# Patient Record
Sex: FEMALE | Race: WHITE | Employment: UNEMPLOYED | URBAN - METROPOLITAN AREA
[De-identification: names, ages, dates, MRNs, and addresses within clinical notes are randomized per-mention and may not be internally consistent; named-entity substitution may affect disease eponyms.]

---

## 2018-03-18 ENCOUNTER — HOSPITAL ENCOUNTER (INPATIENT)
Age: 83
LOS: 5 days | Discharge: NURSING, REHAB OR LONG-TERM CARE | DRG: 179 | End: 2018-03-23
Attending: EMERGENCY MEDICINE | Admitting: INTERNAL MEDICINE
Payer: MEDICARE

## 2018-03-18 ENCOUNTER — APPOINTMENT (OUTPATIENT)
Dept: GENERAL RADIOLOGY | Age: 83
DRG: 179 | End: 2018-03-18
Payer: MEDICARE

## 2018-03-18 DIAGNOSIS — J18.9 HCAP (HEALTHCARE-ASSOCIATED PNEUMONIA): Primary | ICD-10-CM

## 2018-03-18 LAB
ALBUMIN SERPL-MCNC: 3.2 G/DL (ref 3.5–5.2)
ALP BLD-CCNC: 136 U/L (ref 35–104)
ALT SERPL-CCNC: 25 U/L (ref 0–32)
ANION GAP SERPL CALCULATED.3IONS-SCNC: 13 MMOL/L (ref 7–16)
AST SERPL-CCNC: 35 U/L (ref 0–31)
BACTERIA: ABNORMAL /HPF
BILIRUB SERPL-MCNC: 0.6 MG/DL (ref 0–1.2)
BILIRUBIN URINE: ABNORMAL
BLOOD, URINE: ABNORMAL
BUN BLDV-MCNC: 18 MG/DL (ref 8–23)
CALCIUM SERPL-MCNC: 9.5 MG/DL (ref 8.6–10.2)
CHLORIDE BLD-SCNC: 102 MMOL/L (ref 98–107)
CLARITY: CLEAR
CO2: 26 MMOL/L (ref 22–29)
COLOR: ABNORMAL
CREAT SERPL-MCNC: 1 MG/DL (ref 0.5–1)
EPITHELIAL CELLS, UA: ABNORMAL /HPF
GFR AFRICAN AMERICAN: >60
GFR NON-AFRICAN AMERICAN: 51 ML/MIN/1.73
GLUCOSE BLD-MCNC: 192 MG/DL (ref 74–109)
GLUCOSE URINE: NEGATIVE MG/DL
HCT VFR BLD CALC: 31.6 % (ref 34–48)
HEMOGLOBIN: 9.3 G/DL (ref 11.5–15.5)
INFLUENZA A BY PCR: NOT DETECTED
INFLUENZA B BY PCR: NOT DETECTED
KETONES, URINE: 40 MG/DL
LACTIC ACID: 1.3 MMOL/L (ref 0.5–2.2)
LEUKOCYTE ESTERASE, URINE: NEGATIVE
LIPASE: 14 U/L (ref 13–60)
MCH RBC QN AUTO: 23.8 PG (ref 26–35)
MCHC RBC AUTO-ENTMCNC: 29.4 % (ref 32–34.5)
MCV RBC AUTO: 81 FL (ref 80–99.9)
NITRITE, URINE: NEGATIVE
PDW BLD-RTO: 16.1 FL (ref 11.5–15)
PH UA: 6 (ref 5–9)
PLATELET # BLD: 344 E9/L (ref 130–450)
PMV BLD AUTO: 10.6 FL (ref 7–12)
POTASSIUM SERPL-SCNC: 3.8 MMOL/L (ref 3.5–5)
PRO-BNP: 1879 PG/ML (ref 0–450)
PROTEIN UA: 100 MG/DL
RBC # BLD: 3.9 E12/L (ref 3.5–5.5)
RBC UA: ABNORMAL /HPF (ref 0–2)
SODIUM BLD-SCNC: 141 MMOL/L (ref 132–146)
SPECIFIC GRAVITY UA: >=1.03 (ref 1–1.03)
TOTAL PROTEIN: 8.5 G/DL (ref 6.4–8.3)
TROPONIN: <0.01 NG/ML (ref 0–0.03)
UROBILINOGEN, URINE: 0.2 E.U./DL
WBC # BLD: 17 E9/L (ref 4.5–11.5)
WBC UA: ABNORMAL /HPF (ref 0–5)

## 2018-03-18 PROCEDURE — 81001 URINALYSIS AUTO W/SCOPE: CPT

## 2018-03-18 PROCEDURE — 36415 COLL VENOUS BLD VENIPUNCTURE: CPT

## 2018-03-18 PROCEDURE — 80053 COMPREHEN METABOLIC PANEL: CPT

## 2018-03-18 PROCEDURE — 6370000000 HC RX 637 (ALT 250 FOR IP): Performed by: INTERNAL MEDICINE

## 2018-03-18 PROCEDURE — 6360000002 HC RX W HCPCS: Performed by: EMERGENCY MEDICINE

## 2018-03-18 PROCEDURE — 87088 URINE BACTERIA CULTURE: CPT

## 2018-03-18 PROCEDURE — 87205 SMEAR GRAM STAIN: CPT

## 2018-03-18 PROCEDURE — 2580000003 HC RX 258: Performed by: EMERGENCY MEDICINE

## 2018-03-18 PROCEDURE — 99222 1ST HOSP IP/OBS MODERATE 55: CPT | Performed by: INTERNAL MEDICINE

## 2018-03-18 PROCEDURE — 87070 CULTURE OTHR SPECIMN AEROBIC: CPT

## 2018-03-18 PROCEDURE — 71045 X-RAY EXAM CHEST 1 VIEW: CPT

## 2018-03-18 PROCEDURE — 83880 ASSAY OF NATRIURETIC PEPTIDE: CPT

## 2018-03-18 PROCEDURE — 84484 ASSAY OF TROPONIN QUANT: CPT

## 2018-03-18 PROCEDURE — 99285 EMERGENCY DEPT VISIT HI MDM: CPT

## 2018-03-18 PROCEDURE — 83690 ASSAY OF LIPASE: CPT

## 2018-03-18 PROCEDURE — 2060000000 HC ICU INTERMEDIATE R&B

## 2018-03-18 PROCEDURE — 83605 ASSAY OF LACTIC ACID: CPT

## 2018-03-18 PROCEDURE — 93005 ELECTROCARDIOGRAM TRACING: CPT | Performed by: EMERGENCY MEDICINE

## 2018-03-18 PROCEDURE — 87040 BLOOD CULTURE FOR BACTERIA: CPT

## 2018-03-18 PROCEDURE — 2580000003 HC RX 258: Performed by: INTERNAL MEDICINE

## 2018-03-18 PROCEDURE — 87502 INFLUENZA DNA AMP PROBE: CPT

## 2018-03-18 PROCEDURE — 85027 COMPLETE CBC AUTOMATED: CPT

## 2018-03-18 PROCEDURE — 51702 INSERT TEMP BLADDER CATH: CPT

## 2018-03-18 PROCEDURE — 6360000002 HC RX W HCPCS: Performed by: INTERNAL MEDICINE

## 2018-03-18 PROCEDURE — 87186 SC STD MICRODIL/AGAR DIL: CPT

## 2018-03-18 PROCEDURE — 6370000000 HC RX 637 (ALT 250 FOR IP): Performed by: EMERGENCY MEDICINE

## 2018-03-18 PROCEDURE — 94640 AIRWAY INHALATION TREATMENT: CPT

## 2018-03-18 PROCEDURE — 96365 THER/PROPH/DIAG IV INF INIT: CPT

## 2018-03-18 PROCEDURE — 2700000000 HC OXYGEN THERAPY PER DAY

## 2018-03-18 RX ORDER — IPRATROPIUM BROMIDE AND ALBUTEROL SULFATE 2.5; .5 MG/3ML; MG/3ML
1 SOLUTION RESPIRATORY (INHALATION)
Status: DISCONTINUED | OUTPATIENT
Start: 2018-03-18 | End: 2018-03-23 | Stop reason: HOSPADM

## 2018-03-18 RX ORDER — SODIUM CHLORIDE 9 MG/ML
INJECTION, SOLUTION INTRAVENOUS CONTINUOUS
Status: DISCONTINUED | OUTPATIENT
Start: 2018-03-18 | End: 2018-03-23 | Stop reason: HOSPADM

## 2018-03-18 RX ORDER — AMITRIPTYLINE HYDROCHLORIDE 10 MG/1
10 TABLET, FILM COATED ORAL NIGHTLY
Status: DISCONTINUED | OUTPATIENT
Start: 2018-03-18 | End: 2018-03-23 | Stop reason: HOSPADM

## 2018-03-18 RX ORDER — LEVOTHYROXINE SODIUM 0.2 MG/1
200 TABLET ORAL DAILY
Status: DISCONTINUED | OUTPATIENT
Start: 2018-03-19 | End: 2018-03-23 | Stop reason: HOSPADM

## 2018-03-18 RX ORDER — SODIUM CHLORIDE 0.9 % (FLUSH) 0.9 %
10 SYRINGE (ML) INJECTION PRN
Status: DISCONTINUED | OUTPATIENT
Start: 2018-03-18 | End: 2018-03-23 | Stop reason: HOSPADM

## 2018-03-18 RX ORDER — GUAIFENESIN/DEXTROMETHORPHAN 100-10MG/5
10 SYRUP ORAL 4 TIMES DAILY
Status: DISCONTINUED | OUTPATIENT
Start: 2018-03-18 | End: 2018-03-23 | Stop reason: HOSPADM

## 2018-03-18 RX ORDER — SUCRALFATE 1 G/1
1 TABLET ORAL 2 TIMES DAILY
Status: DISCONTINUED | OUTPATIENT
Start: 2018-03-18 | End: 2018-03-23 | Stop reason: HOSPADM

## 2018-03-18 RX ORDER — SUCRALFATE 1 G/1
1 TABLET ORAL 2 TIMES DAILY
COMMUNITY

## 2018-03-18 RX ORDER — PANTOPRAZOLE SODIUM 40 MG/1
40 TABLET, DELAYED RELEASE ORAL DAILY
Status: DISCONTINUED | OUTPATIENT
Start: 2018-03-18 | End: 2018-03-23 | Stop reason: HOSPADM

## 2018-03-18 RX ORDER — POTASSIUM CHLORIDE 750 MG/1
10 TABLET, EXTENDED RELEASE ORAL
Status: DISCONTINUED | OUTPATIENT
Start: 2018-03-19 | End: 2018-03-23 | Stop reason: HOSPADM

## 2018-03-18 RX ORDER — GUAIFENESIN 100 MG/5ML
200 SYRUP ORAL EVERY 4 HOURS PRN
Status: ON HOLD | COMMUNITY
End: 2018-03-22 | Stop reason: HOSPADM

## 2018-03-18 RX ORDER — TRAMADOL HYDROCHLORIDE 50 MG/1
50 TABLET ORAL EVERY 6 HOURS PRN
Status: DISCONTINUED | OUTPATIENT
Start: 2018-03-18 | End: 2018-03-23 | Stop reason: HOSPADM

## 2018-03-18 RX ORDER — DOCUSATE SODIUM 100 MG/1
100 CAPSULE, LIQUID FILLED ORAL 2 TIMES DAILY
Status: DISCONTINUED | OUTPATIENT
Start: 2018-03-18 | End: 2018-03-23 | Stop reason: HOSPADM

## 2018-03-18 RX ORDER — CALCIUM CARBONATE 500(1250)
500 TABLET ORAL 2 TIMES DAILY
Status: DISCONTINUED | OUTPATIENT
Start: 2018-03-18 | End: 2018-03-23 | Stop reason: HOSPADM

## 2018-03-18 RX ORDER — ALUMINA, MAGNESIA, AND SIMETHICONE 2400; 2400; 240 MG/30ML; MG/30ML; MG/30ML
30 SUSPENSION ORAL EVERY 4 HOURS PRN
COMMUNITY

## 2018-03-18 RX ORDER — AMITRIPTYLINE HYDROCHLORIDE 10 MG/1
10 TABLET, FILM COATED ORAL NIGHTLY
COMMUNITY

## 2018-03-18 RX ORDER — METOPROLOL SUCCINATE 25 MG/1
25 TABLET, EXTENDED RELEASE ORAL DAILY
Status: DISCONTINUED | OUTPATIENT
Start: 2018-03-18 | End: 2018-03-23 | Stop reason: HOSPADM

## 2018-03-18 RX ORDER — ONDANSETRON 2 MG/ML
4 INJECTION INTRAMUSCULAR; INTRAVENOUS EVERY 6 HOURS PRN
Status: DISCONTINUED | OUTPATIENT
Start: 2018-03-18 | End: 2018-03-23 | Stop reason: HOSPADM

## 2018-03-18 RX ORDER — ACETAMINOPHEN 325 MG/1
650 TABLET ORAL EVERY 4 HOURS PRN
Status: DISCONTINUED | OUTPATIENT
Start: 2018-03-18 | End: 2018-03-23 | Stop reason: HOSPADM

## 2018-03-18 RX ORDER — SODIUM CHLORIDE 0.9 % (FLUSH) 0.9 %
10 SYRINGE (ML) INJECTION EVERY 12 HOURS SCHEDULED
Status: DISCONTINUED | OUTPATIENT
Start: 2018-03-18 | End: 2018-03-23 | Stop reason: HOSPADM

## 2018-03-18 RX ORDER — ACETAMINOPHEN 500 MG
1000 TABLET ORAL ONCE
Status: COMPLETED | OUTPATIENT
Start: 2018-03-18 | End: 2018-03-18

## 2018-03-18 RX ORDER — LANOLIN ALCOHOL/MO/W.PET/CERES
1000 CREAM (GRAM) TOPICAL DAILY
Status: DISCONTINUED | OUTPATIENT
Start: 2018-03-18 | End: 2018-03-23 | Stop reason: HOSPADM

## 2018-03-18 RX ORDER — FUROSEMIDE 20 MG/1
20 TABLET ORAL
Status: DISCONTINUED | OUTPATIENT
Start: 2018-03-19 | End: 2018-03-23 | Stop reason: HOSPADM

## 2018-03-18 RX ADMIN — CEFEPIME HYDROCHLORIDE 2 G: 2 INJECTION, POWDER, FOR SOLUTION INTRAVENOUS at 13:52

## 2018-03-18 RX ADMIN — VANCOMYCIN HYDROCHLORIDE 1250 MG: 10 INJECTION, POWDER, LYOPHILIZED, FOR SOLUTION INTRAVENOUS at 15:42

## 2018-03-18 RX ADMIN — Medication 10 ML: at 20:25

## 2018-03-18 RX ADMIN — ACETAMINOPHEN 1000 MG: 500 TABLET ORAL at 11:33

## 2018-03-18 RX ADMIN — AMITRIPTYLINE HYDROCHLORIDE 10 MG: 10 TABLET, FILM COATED ORAL at 20:26

## 2018-03-18 RX ADMIN — METOPROLOL SUCCINATE 25 MG: 25 TABLET, FILM COATED, EXTENDED RELEASE ORAL at 20:25

## 2018-03-18 RX ADMIN — SUCRALFATE 1 G: 1 TABLET ORAL at 20:25

## 2018-03-18 RX ADMIN — ENOXAPARIN SODIUM 30 MG: 30 INJECTION SUBCUTANEOUS at 20:25

## 2018-03-18 RX ADMIN — Medication 500 MG: at 20:26

## 2018-03-18 RX ADMIN — GUAIFENESIN AND DEXTROMETHORPHAN 10 ML: 100; 10 SYRUP ORAL at 20:25

## 2018-03-18 RX ADMIN — SODIUM CHLORIDE: 9 INJECTION, SOLUTION INTRAVENOUS at 20:24

## 2018-03-18 RX ADMIN — PANTOPRAZOLE SODIUM 40 MG: 40 TABLET, DELAYED RELEASE ORAL at 20:25

## 2018-03-18 RX ADMIN — DOCUSATE SODIUM 100 MG: 100 CAPSULE, LIQUID FILLED ORAL at 20:25

## 2018-03-18 RX ADMIN — IPRATROPIUM BROMIDE AND ALBUTEROL SULFATE 1 AMPULE: 2.5; .5 SOLUTION RESPIRATORY (INHALATION) at 22:26

## 2018-03-18 RX ADMIN — Medication 1000 MCG: at 20:26

## 2018-03-18 NOTE — H&P
History and Physical      CHIEF COMPLAINT:  Cough and fever      HISTORY OF PRESENT ILLNESS:      The patient is a 80 y.o. female patient of Dr. Archana Mathis presents with productive cough and fever with onset 1day earlier. History reviewed with the ER physician and SNF records. The patient mentally impaired d/t dementia and unreliable historian. RADIOLOGY:  Interpreted by Radiologist.  XR CHEST PORTABLE  Final Result   1. Borderline heart size, small pleural effusions and perihilar  vascular congestion. 2. Mild to moderate airspace opacities and consolidation of the lower  lungs more pronounced on the left may represent atelectasis or  infiltrates.   3. Moderate size hiatal hernia       Past Medical History:    Past Medical History:   Diagnosis Date    Acid reflux     Acute blood loss anemia 9/14/2016    Acute Omi ulcer 9/15/2016    Alzheimer's dementia 3/19/2018    B12 deficiency     Gastrointestinal hemorrhage associated with gastritis 9/14/2016    Hypothyroid 9/14/2016    Intertrochanteric fracture of right hip (HCC) 2/3/2015    MCI (mild cognitive impairment)     MCI (mild cognitive impairment) with memory loss 9/14/2016    STARTED 2015; ACUTE CONFUSION 9/16    Non-rheumatic aortic sclerosis ([address] 9/14/2016    OA (osteoarthritis) of ankle     Oropharyngeal dysphagia 3/19/2018    Thyroid disease        Past Surgical History:    Past Surgical History:   Procedure Laterality Date    BACK SURGERY      CARDIAC SURGERY      HIP SURGERY      HIP SURGERY Right [id number]    HYSTERECTOMY      UPPER GASTROINTESTINAL ENDOSCOPY  09/15/2016       Medications Prior to Admission:    Prescriptions Prior to Admission: sucralfate (CARAFATE) 1 GM tablet, Take 1 g by mouth 2 times daily  amitriptyline (ELAVIL) 10 MG tablet, Take 10 mg by mouth nightly  aluminum & magnesium hydroxide-simethicone (MYLANTA) 400-400-40 MG/5ML SUSP, Take 30 mLs by mouth every 4 hours as needed  guaiFENesin (ROBITUSSIN) 100 MG/5ML syrup, Take 200 mg by mouth every 4 hours as needed for Cough  pantoprazole (PROTONIX) 40 MG tablet, Take 1 tablet by mouth daily  cephALEXin (KEFLEX) 500 MG capsule, Take 1 capsule by mouth 4 times daily  levothyroxine (SYNTHROID) 200 MCG tablet, Take 200 mcg by mouth Daily  furosemide (LASIX) 20 MG tablet, Take 20 mg by mouth See Admin Instructions Mon, Wed, Fri ONLY  potassium chloride (MICRO-K) 10 MEQ extended release capsule, Take 10 mEq by mouth See Admin Instructions Mon, Wed, Fri ONLY  docusate sodium (COLACE) 100 MG capsule, Take 100 mg by mouth 2 times daily Indications: Constipation. metoprolol (TOPROL-XL) 25 MG XL tablet, Take 1 tablet by mouth daily. calcium carbonate (OSCAL) 500 MG TABS tablet, Take 500 mg by mouth 2 times daily. acetaminophen (TYLENOL) 325 MG tablet, Take 650 mg by mouth every 4 hours as needed for Pain or Fever   vitamin B-12 (CYANOCOBALAMIN) 1000 MCG tablet, Take 1,000 mcg by mouth daily. traMADol (ULTRAM) 50 MG tablet, Take 50 mg by mouth every 6 hours as needed for Pain . Allergies:    Patient has no known allergies. Social History:    reports that she has never smoked. She does not have any smokeless tobacco history on file. She reports that she does not drink alcohol or use drugs. Family History:   family history is not on file. REVIEW OF SYSTEMS:  As above in the HPI, otherwise negative    PHYSICAL EXAM:    Vitals:  BP (!) 170/76   Pulse 92   Temp 99 °F (37.2 °C) (Oral)   Resp 24   Ht 5' 5\" (1.651 m)   Wt 175 lb (79.4 kg)   SpO2 97%   BMI 29.12 kg/m²     General:   Awake, alert, moist cough. No acute distress. HEENT:    Normocephalic, atraumatic. Pupils equal, round, reactive to light. No scleral icterus. No conjunctival injection. Oropharynx clear. No nasal discharge. Neck:       Supple. No bruits, adenopathy, masses, neck vein distention. Trachea in the midline.   Heart:      RRR, systolic murmurs, no gallops, no rubs  Lungs:     Basilar

## 2018-03-18 NOTE — ED PROVIDER NOTES
endoscopy (09/15/2016). Social History:  reports that she has never smoked. She does not have any smokeless tobacco history on file. She reports that she does not drink alcohol or use drugs. Family History: family history is not on file. The patients home medications have been reviewed. Allergies: Patient has no known allergies. ---------------------------------------------------PHYSICAL EXAM--------------------------------------    Constitutional: She is elderly and alert. No distress. Head: Normocephalic and atraumatic. Eyes: Conjunctivae are normal.   ENT: Airway intact. Mouth: Oropharynx clear, handling secretions  Neck: Neck supple. Cardiovascular: Regular rate. Regular rhythm. Murmur appreciable. Distal pulses are intact. Pulmonary/Chest: No respiratory distress. Breath sounds normal.    Abdominal: Soft. She exhibits mild distension. There is diffuse tenderness with palpation. Musculoskeletal: She exhibits no edema. Moves all extremities x4. Skin: Skin is warm and dry. No rashes. Neurological: She is alert and oriented to person, place, and names of family members, but not the year. Full neurological exam limited by the patient's mental status.    -------------------------------------------------- RESULTS -------------------------------------------------  I have personally reviewed all laboratory and imaging results for this patient. Results are listed below.      LABS:  Results for orders placed or performed during the hospital encounter of 03/18/18   Rapid influenza A/B antigens   Result Value Ref Range    Influenza A by PCR Not Detected Not Detected    Influenza B by PCR Not Detected Not Detected   CBC   Result Value Ref Range    WBC 17.0 (H) 4.5 - 11.5 E9/L    RBC 3.90 3.50 - 5.50 E12/L    Hemoglobin 9.3 (L) 11.5 - 15.5 g/dL    Hematocrit 31.6 (L) 34.0 - 48.0 %    MCV 81.0 80.0 - 99.9 fL    MCH 23.8 (L) 26.0 - 35.0 pg    MCHC 29.4 (L) 32.0 - 34.5 %    RDW 16.1 (H) 11.5 - 15.0 fL    Platelets 505 884 - 983 E9/L    MPV 10.6 7.0 - 12.0 fL   Comprehensive Metabolic Panel   Result Value Ref Range    Sodium 141 132 - 146 mmol/L    Potassium 3.8 3.5 - 5.0 mmol/L    Chloride 102 98 - 107 mmol/L    CO2 26 22 - 29 mmol/L    Anion Gap 13 7 - 16 mmol/L    Glucose 192 (H) 74 - 109 mg/dL    BUN 18 8 - 23 mg/dL    CREATININE 1.0 0.5 - 1.0 mg/dL    GFR Non-African American 51 >=60 mL/min/1.73    GFR African American >60     Calcium 9.5 8.6 - 10.2 mg/dL    Total Protein 8.5 (H) 6.4 - 8.3 g/dL    Alb 3.2 (L) 3.5 - 5.2 g/dL    Total Bilirubin 0.6 0.0 - 1.2 mg/dL    Alkaline Phosphatase 136 (H) 35 - 104 U/L    ALT 25 0 - 32 U/L    AST 35 (H) 0 - 31 U/L   Lactic Acid, Plasma   Result Value Ref Range    Lactic Acid 1.3 0.5 - 2.2 mmol/L   Lipase   Result Value Ref Range    Lipase 14 13 - 60 U/L   Troponin   Result Value Ref Range    Troponin <0.01 0.00 - 0.03 ng/mL   Brain Natriuretic Peptide   Result Value Ref Range    Pro-BNP 1,879 (H) 0 - 450 pg/mL   Urinalysis   Result Value Ref Range    Color, UA DKYELLOW Straw/Yellow    Clarity, UA Clear Clear    Glucose, Ur Negative Negative mg/dL    Bilirubin Urine SMALL (A) Negative    Ketones, Urine 40 (A) Negative mg/dL    Specific Gravity, UA >=1.030 1.005 - 1.030    Blood, Urine MODERATE (A) Negative    pH, UA 6.0 5.0 - 9.0    Protein,  (A) Negative mg/dL    Urobilinogen, Urine 0.2 <2.0 E.U./dL    Nitrite, Urine Negative Negative    Leukocyte Esterase, Urine Negative Negative   Microscopic Urinalysis   Result Value Ref Range    WBC, UA NONE 0 - 5 /HPF    RBC, UA 2-5 0 - 2 /HPF    Epi Cells FEW /HPF    Bacteria, UA FEW (A) /HPF   EKG 12 Lead   Result Value Ref Range    Ventricular Rate 94 BPM    Atrial Rate 94 BPM    P-R Interval 146 ms    QRS Duration 82 ms    Q-T Interval 356 ms    QTc Calculation (Bazett) 445 ms    P Axis 84 degrees    R Axis 13 degrees    T Axis 134 degrees       RADIOLOGY:  Interpreted by Radiologist.  XR CHEST PORTABLE   Final course. Re-Evaluations:            Time: 12:41  Re-evaluation. Patients symptoms show no change. She is resting comfortably, in no distress. Time: 1:43  Re-evaluation. Patients symptoms show no change. She remains in no distress. She is informed of findings and admission. Consultations:   Time: 14:50. Spoke with Dr. Carlos Krishnan (Medicine). Discussed case. They will admit this patient. Counseling: The emergency provider has spoken with the patient and discussed todays results, in addition to providing specific details for the plan of care and counseling regarding the diagnosis and prognosis. Questions are answered at this time and they are agreeable with the plan.      --------------------------------- IMPRESSION AND DISPOSITION ---------------------------------    IMPRESSION  1. HCAP (healthcare-associated pneumonia)        DISPOSITION  Disposition: Admit to telemetry  Patient condition is stable    3/18/18, 10:59 AM.    This note is prepared by Sonu Guillermo, acting as Scribe for Ronan Sesay DO. Ronan Sesay DO:  The scribe's documentation has been prepared under my direction and personally reviewed by me in its entirety. I confirm that the note above accurately reflects all work, treatment, procedures, and medical decision making performed by me. NOTE: This report was transcribed using voice recognition software. Every effort was made to ensure accuracy; however, inadvertent computerized transcription errors may be present.   END OF PROVIDER NOTE        Ronan Sesay DO  04/03/18 0066

## 2018-03-18 NOTE — ED NOTES
Nurse to nurse report called to the floor, spoke to Rohit. Patient's test results reviewed, and vitals signs reported to the receiving nurse. And any and all other important information regarding the patient disclosed.         Carlos Hawk RN  03/18/18 [id number]

## 2018-03-18 NOTE — ED NOTES
Bed: 22  Expected date:   Expected time:   Means of arrival:   Comments:  ems     Layne Joshi RN  03/18/18 [id number]

## 2018-03-18 NOTE — PROGRESS NOTES
Pharmacy Consultation Note  (Antibiotic Dosing and Monitoring)    Initial consult date: 3/18/18  Consulting physician: Dr. Prasad Agustin  Drug(s): Vancomycin   Indication: Pneumonia    BR is a 80year old female starting on empiric antibiotics due to concern for pneumonia. Pharmacy was consulted to assist with dosing of vancomycin. Ht Readings from Last 1 Encounters:   18 5' 5\" (1.651 m)       Age/Gender IBW DW  Allergy Information   80 y.o.  female 57 kg 65.2 kg  Patient has no known allergies. Date  WBC BUN/sCr UOP Drug/Dose Time   Given Level(s)   (Time) Comments   3/18  (#1) 17   Vancomycin 1.25g IV x1 1542     3/19  (#2)    Vancomycin 1g IV q24h <1600>       (#3)            (#4)            (#5)            (#6)            Other Antibiotics/Antifungals/Antivirals Start Date Stop Date Comments   Cefepime 2 mg x1, then cefepime 1g IV Q12h                            Estimated CrCL:  30-35 mL/min    No intake or output data in the 24 hours ending 18 1750    Temp max: Temp (24hrs), Av.9 °F (37.2 °C), Min:97.8 °F (36.6 °C), Max:101 °F (38.3 °C)      Cultures:    Culture Date Result    Rapid Influenza 3/18 Negative   Blood Cxs 3/18 Pending   Resp Cx 3/18 Pending   Urine Cx 3/18 Pending       Assessment:  · Consulted by Dr. Prasad Agustin to dose/monitor vancomycin for possible HCAP  · Patient presented with cough and fever. Chest Xray revealed and moderate air opacities. · Patient was given Vanco 1.25g IV x 1 today in ED  · Goal trough level:  15-20 mcg/mL    Plan:  · Start Vancomycin 1g IV Q24h tomorrow  · Continue to monitor renal renal function and plan to draw trough prior to 4th dose pending clinical course/culture data  · Pharmacist will follow and monitor/adjust dosing as necessary    Thank you for this consult, please page with questions.     Yaakov Guzmán, PharmD, BCPS, BCCCP 3/18/2018 5:50 PM  Pager: [phone]

## 2018-03-19 ENCOUNTER — APPOINTMENT (OUTPATIENT)
Dept: GENERAL RADIOLOGY | Age: 83
DRG: 179 | End: 2018-03-19
Payer: MEDICARE

## 2018-03-19 PROBLEM — F02.80 ALZHEIMER'S DEMENTIA (HCC): Status: ACTIVE | Noted: 2018-03-19

## 2018-03-19 PROBLEM — R13.12 OROPHARYNGEAL DYSPHAGIA: Status: ACTIVE | Noted: 2018-03-19

## 2018-03-19 PROBLEM — G30.9 ALZHEIMER'S DEMENTIA (HCC): Status: ACTIVE | Noted: 2018-03-19

## 2018-03-19 LAB
ANION GAP SERPL CALCULATED.3IONS-SCNC: 17 MMOL/L (ref 7–16)
BASOPHILS ABSOLUTE: 0.05 E9/L (ref 0–0.2)
BASOPHILS RELATIVE PERCENT: 0.4 % (ref 0–2)
BUN BLDV-MCNC: 13 MG/DL (ref 8–23)
CALCIUM SERPL-MCNC: 9.1 MG/DL (ref 8.6–10.2)
CHLORIDE BLD-SCNC: 101 MMOL/L (ref 98–107)
CO2: 23 MMOL/L (ref 22–29)
CREAT SERPL-MCNC: 0.7 MG/DL (ref 0.5–1)
EOSINOPHILS ABSOLUTE: 0.24 E9/L (ref 0.05–0.5)
EOSINOPHILS RELATIVE PERCENT: 1.7 % (ref 0–6)
GFR AFRICAN AMERICAN: >60
GFR NON-AFRICAN AMERICAN: >60 ML/MIN/1.73
GLUCOSE BLD-MCNC: 105 MG/DL (ref 74–109)
HCT VFR BLD CALC: 32.1 % (ref 34–48)
HEMOGLOBIN: 9.5 G/DL (ref 11.5–15.5)
IMMATURE GRANULOCYTES #: 0.1 E9/L
IMMATURE GRANULOCYTES %: 0.7 % (ref 0–5)
LYMPHOCYTES ABSOLUTE: 2.37 E9/L (ref 1.5–4)
LYMPHOCYTES RELATIVE PERCENT: 16.8 % (ref 20–42)
MCH RBC QN AUTO: 24.8 PG (ref 26–35)
MCHC RBC AUTO-ENTMCNC: 29.6 % (ref 32–34.5)
MCV RBC AUTO: 83.8 FL (ref 80–99.9)
MONOCYTES ABSOLUTE: 1.14 E9/L (ref 0.1–0.95)
MONOCYTES RELATIVE PERCENT: 8.1 % (ref 2–12)
NEUTROPHILS ABSOLUTE: 10.2 E9/L (ref 1.8–7.3)
NEUTROPHILS RELATIVE PERCENT: 72.3 % (ref 43–80)
PDW BLD-RTO: 16.2 FL (ref 11.5–15)
PLATELET # BLD: 272 E9/L (ref 130–450)
PMV BLD AUTO: 11.8 FL (ref 7–12)
POTASSIUM REFLEX MAGNESIUM: 4 MMOL/L (ref 3.5–5)
RBC # BLD: 3.83 E12/L (ref 3.5–5.5)
SODIUM BLD-SCNC: 141 MMOL/L (ref 132–146)
WBC # BLD: 14.1 E9/L (ref 4.5–11.5)

## 2018-03-19 PROCEDURE — 2700000000 HC OXYGEN THERAPY PER DAY

## 2018-03-19 PROCEDURE — 6360000002 HC RX W HCPCS: Performed by: INTERNAL MEDICINE

## 2018-03-19 PROCEDURE — 87798 DETECT AGENT NOS DNA AMP: CPT

## 2018-03-19 PROCEDURE — 85025 COMPLETE CBC W/AUTO DIFF WBC: CPT

## 2018-03-19 PROCEDURE — 99233 SBSQ HOSP IP/OBS HIGH 50: CPT | Performed by: INTERNAL MEDICINE

## 2018-03-19 PROCEDURE — 74230 X-RAY XM SWLNG FUNCJ C+: CPT

## 2018-03-19 PROCEDURE — 80048 BASIC METABOLIC PNL TOTAL CA: CPT

## 2018-03-19 PROCEDURE — 36415 COLL VENOUS BLD VENIPUNCTURE: CPT

## 2018-03-19 PROCEDURE — 2060000000 HC ICU INTERMEDIATE R&B

## 2018-03-19 PROCEDURE — 6370000000 HC RX 637 (ALT 250 FOR IP): Performed by: INTERNAL MEDICINE

## 2018-03-19 PROCEDURE — 92611 MOTION FLUOROSCOPY/SWALLOW: CPT

## 2018-03-19 PROCEDURE — 87503 INFLUENZA DNA AMP PROB ADDL: CPT

## 2018-03-19 PROCEDURE — 87501 INFLUENZA DNA AMP PROB 1+: CPT

## 2018-03-19 PROCEDURE — 87581 M.PNEUMON DNA AMP PROBE: CPT

## 2018-03-19 PROCEDURE — 87450 HC DIRECT STREP B ANTIGEN: CPT

## 2018-03-19 PROCEDURE — 87486 CHLMYD PNEUM DNA AMP PROBE: CPT

## 2018-03-19 PROCEDURE — 94640 AIRWAY INHALATION TREATMENT: CPT

## 2018-03-19 PROCEDURE — 2580000003 HC RX 258: Performed by: INTERNAL MEDICINE

## 2018-03-19 RX ADMIN — VANCOMYCIN HYDROCHLORIDE 1000 MG: 1 INJECTION, POWDER, LYOPHILIZED, FOR SOLUTION INTRAVENOUS at 16:50

## 2018-03-19 RX ADMIN — METOPROLOL SUCCINATE 25 MG: 25 TABLET, FILM COATED, EXTENDED RELEASE ORAL at 09:10

## 2018-03-19 RX ADMIN — IPRATROPIUM BROMIDE AND ALBUTEROL SULFATE 1 AMPULE: 2.5; .5 SOLUTION RESPIRATORY (INHALATION) at 22:03

## 2018-03-19 RX ADMIN — SUCRALFATE 1 G: 1 TABLET ORAL at 20:55

## 2018-03-19 RX ADMIN — DOCUSATE SODIUM 100 MG: 100 CAPSULE, LIQUID FILLED ORAL at 09:10

## 2018-03-19 RX ADMIN — CEFEPIME HYDROCHLORIDE 1 G: 1 INJECTION, POWDER, FOR SOLUTION INTRAMUSCULAR; INTRAVENOUS at 02:11

## 2018-03-19 RX ADMIN — IPRATROPIUM BROMIDE AND ALBUTEROL SULFATE 1 AMPULE: 2.5; .5 SOLUTION RESPIRATORY (INHALATION) at 12:53

## 2018-03-19 RX ADMIN — PANTOPRAZOLE SODIUM 40 MG: 40 TABLET, DELAYED RELEASE ORAL at 09:10

## 2018-03-19 RX ADMIN — Medication 500 MG: at 09:10

## 2018-03-19 RX ADMIN — GUAIFENESIN AND DEXTROMETHORPHAN 10 ML: 100; 10 SYRUP ORAL at 14:15

## 2018-03-19 RX ADMIN — GUAIFENESIN AND DEXTROMETHORPHAN 10 ML: 100; 10 SYRUP ORAL at 20:55

## 2018-03-19 RX ADMIN — POTASSIUM CHLORIDE 10 MEQ: 10 TABLET, EXTENDED RELEASE ORAL at 09:10

## 2018-03-19 RX ADMIN — FUROSEMIDE 20 MG: 20 TABLET ORAL at 09:10

## 2018-03-19 RX ADMIN — IPRATROPIUM BROMIDE AND ALBUTEROL SULFATE 1 AMPULE: 2.5; .5 SOLUTION RESPIRATORY (INHALATION) at 09:05

## 2018-03-19 RX ADMIN — DOCUSATE SODIUM 100 MG: 100 CAPSULE, LIQUID FILLED ORAL at 20:55

## 2018-03-19 RX ADMIN — IPRATROPIUM BROMIDE AND ALBUTEROL SULFATE 1 AMPULE: 2.5; .5 SOLUTION RESPIRATORY (INHALATION) at 17:32

## 2018-03-19 RX ADMIN — LEVOTHYROXINE SODIUM 200 MCG: 200 TABLET ORAL at 05:17

## 2018-03-19 RX ADMIN — GUAIFENESIN AND DEXTROMETHORPHAN 10 ML: 100; 10 SYRUP ORAL at 09:09

## 2018-03-19 RX ADMIN — CEFEPIME HYDROCHLORIDE 1 G: 1 INJECTION, POWDER, FOR SOLUTION INTRAMUSCULAR; INTRAVENOUS at 14:15

## 2018-03-19 RX ADMIN — SUCRALFATE 1 G: 1 TABLET ORAL at 09:09

## 2018-03-19 RX ADMIN — AMITRIPTYLINE HYDROCHLORIDE 10 MG: 10 TABLET, FILM COATED ORAL at 20:55

## 2018-03-19 RX ADMIN — ENOXAPARIN SODIUM 30 MG: 30 INJECTION SUBCUTANEOUS at 09:10

## 2018-03-19 RX ADMIN — Medication 500 MG: at 20:55

## 2018-03-19 RX ADMIN — SODIUM CHLORIDE: 9 INJECTION, SOLUTION INTRAVENOUS at 11:34

## 2018-03-19 RX ADMIN — Medication 1000 MCG: at 09:10

## 2018-03-19 ASSESSMENT — PAIN SCALES - GENERAL
PAINLEVEL_OUTOF10: 0

## 2018-03-19 NOTE — PROGRESS NOTES
PROCEDURE     Consistencies Administered During the Evaluation   Liquids: [x]Thin    []Nectar   [x]Honey   Solids:  [x]Pureed/Pudding  []Soft Solid   [x]Cookie   Other:       Method of Intake:   [x]Cup   [x]Spoon  [x]Straw  []Self Fed  [x]Fed by Clinician     Position:   [x]Upright seated  []Upright Standing  []Supine, elevated 45°   []Anterior/Posterior  [x]Lateral   []Oblique                   RESULTS     ORAL STAGE []Functional  [x]Abnormal      [] Dentition: ([]natural  []missing teeth []edentulous []partials []other )     [] Inadequate labial seal resulting anterior labial spillage from ([]right[] left []midline )     [x] Decreased mastication due to ([]poor/missing dentition []decreased lingual control [x]cognitive status)      [] Delayed A-P transit due to ([]decreased initiation[] reduced lingual strength[]cognitive function )     [] Decreased bolus formation resulting in premature pharyngeal spillage      [] Oral residuals []anterior sulcus  []sub lingually  []right buccal cavity []left buccal cavity  []on tongue base  []throughout oral cavity []on superior tongue  []on palate  []on velum          []Comments:        PHARYNGEAL STAGE     [x] Functional  []Abnormal       ONSET TIME    [x] Onset time of the pharyngeal swallow was adequate      [] Delayed initiation of the pharyngeal swallow ([]mild []moderate []marked []severe []absent ).        Swallow reflex was triggered at: ([]tongue base []valleculae []pyriform sinus)      PHARYNGEAL RESIDUALS          Vallecula/Pharyngeal Wall    [x]No significant residuals were noted in the vallecula      []Reduced tongue base retraction resulting in: ([]residuals in vallecula []residual on posterior pharyngeal wall)    These residuals were noted for ([]thin []nectar[] honey []pureed []solid)      which( []cleared  []did not clear  []partially cleared []mostly cleared)       with ([]cued []spontaneous []double swallow []multiple swallow (  times) []liquid chaser)      []Residuals in the vallecula were noted due to inadequate epiglottic inversion        Pyriform Sinuses    [x]No significant residuals were noted in the pyriform sinuses      [] Residuals in the pyriform sinuses were noted due to ([]pharyngeal weakness []cricopharyngeal dysfunction.)       These residuals were noted for ([]thin []nectar []honey []pureed []solid)       which ([]cleared []did not clear  []partially cleared  []mostly cleared)        with ([]cued []spontaneous  []double swallow []multiple swallow (  times) []liquid chaser)    LARYNGEAL PENETRATION   [x]Laryngeal penetration was not present during this evaluation      []Laryngeal penetration occurred prior to aspiration. Further details under aspiration section. []Laryngeal penetration occurred in the absence of aspiration:       []BEFORE the swallow for ([]thin []nectar []honey[] pureed []solid)      due to: [] decreased bolus formation      []premature pharyngeal entry       []delayed pharyngeal swallow           which  []cleared from the laryngeal vestibule spontaneously  (transient)     []cleared from the laryngeal vestibule with a cued, re-directive throat clear       []remained in the laryngeal vestibule. []penetrated deep into the laryngeal vestibule (to the level of the true vocal folds)      Laryngeal penetration was  ([]trace []mild []moderate []marked []severe ) and      occurred ([]inconsistently  []consistently []only with use of a straw). []DURING  the swallow for ([]thin []nectar []honey[] pureed []solid)       due to: []delayed laryngeal closure      []inadequate laryngeal closure        which  []cleared from the laryngeal vestibule spontaneously  (transient)     []cleared from the laryngeal vestibule with a cued, re-directive throat clear       []remained in the laryngeal vestibule.       []penetrated deep into the laryngeal vestibule (to the level of the true vocal folds)          Laryngeal

## 2018-03-19 NOTE — PLAN OF CARE
Problem: Airway Clearance - Ineffective:  Goal: Clear lung sounds  Clear lung sounds  Outcome: Ongoing    Goal: Ability to maintain a clear airway will improve  Ability to maintain a clear airway will improve  Outcome: Ongoing      Problem: Hyperthermia:  Goal: Ability to maintain a body temperature in the normal range will improve  Ability to maintain a body temperature in the normal range will improve  Outcome: Ongoing

## 2018-03-19 NOTE — CONSULTS
children: N/A    Years of education: N/A     Occupational History    Not on file. Social History Main Topics    Smoking status: Never Smoker    Smokeless tobacco: Not on file    Alcohol use No    Drug use: No    Sexual activity: Not on file     Other Topics Concern    Not on file     Social History Narrative    No narrative on file     History   Smoking Status    Never Smoker   Smokeless Tobacco    Not on file     History   Alcohol Use No     History   Drug Use No             HOME MEDICATIONS:  Prior to Admission medications    Medication Sig Start Date End Date Taking? Authorizing Provider   sucralfate (CARAFATE) 1 GM tablet Take 1 g by mouth 2 times daily   Yes Historical Provider, MD   amitriptyline (ELAVIL) 10 MG tablet Take 10 mg by mouth nightly   Yes Historical Provider, MD   aluminum & magnesium hydroxide-simethicone (MYLANTA) 400-400-40 MG/5ML SUSP Take 30 mLs by mouth every 4 hours as needed   Yes Historical Provider, MD   guaiFENesin (ROBITUSSIN) 100 MG/5ML syrup Take 200 mg by mouth every 4 hours as needed for Cough   Yes Historical Provider, MD   pantoprazole (PROTONIX) 40 MG tablet Take 1 tablet by mouth daily 9/16/16  Yes Xuan Weiner,    cephALEXin (KEFLEX) 500 MG capsule Take 1 capsule by mouth 4 times daily 9/16/16  Yes Marlon Weiner,    levothyroxine (SYNTHROID) 200 MCG tablet Take 200 mcg by mouth Daily   Yes Historical Provider, MD   furosemide (LASIX) 20 MG tablet Take 20 mg by mouth See Admin Instructions Mon, Wed, Fri ONLY   Yes Historical Provider, MD   potassium chloride (MICRO-K) 10 MEQ extended release capsule Take 10 mEq by mouth See Admin Instructions Mon, Wed, Fri ONLY   Yes Historical Provider, MD   docusate sodium (COLACE) 100 MG capsule Take 100 mg by mouth 2 times daily Indications: Constipation. Yes Historical Provider, MD   metoprolol (TOPROL-XL) 25 MG XL tablet Take 1 tablet by mouth daily.  2/7/15  Yes Ashvin Webber, DO   calcium carbonate (OSCAL) 500 MG TABS tablet Take 500 mg by mouth 2 times daily. Yes Historical Provider, MD   acetaminophen (TYLENOL) 325 MG tablet Take 650 mg by mouth every 4 hours as needed for Pain or Fever    Yes Historical Provider, MD   vitamin B-12 (CYANOCOBALAMIN) 1000 MCG tablet Take 1,000 mcg by mouth daily. Yes Historical Provider, MD   traMADol (ULTRAM) 50 MG tablet Take 50 mg by mouth every 6 hours as needed for Pain .    Yes Historical Provider, MD       CURRENT MEDICATIONS:  Current Facility-Administered Medications: amitriptyline (ELAVIL) tablet 10 mg, 10 mg, Oral, Nightly  calcium elemental (OSCAL) tablet 500 mg, 500 mg, Oral, BID  docusate sodium (COLACE) capsule 100 mg, 100 mg, Oral, BID  furosemide (LASIX) tablet 20 mg, 20 mg, Oral, Q MWF  levothyroxine (SYNTHROID) tablet 200 mcg, 200 mcg, Oral, Daily  metoprolol succinate (TOPROL XL) extended release tablet 25 mg, 25 mg, Oral, Daily  pantoprazole (PROTONIX) tablet 40 mg, 40 mg, Oral, Daily  potassium chloride (KLOR-CON M) extended release tablet 10 mEq, 10 mEq, Oral, Q MWF  sucralfate (CARAFATE) tablet 1 g, 1 g, Oral, BID  traMADol (ULTRAM) tablet 50 mg, 50 mg, Oral, Q6H PRN  vitamin B-12 (CYANOCOBALAMIN) tablet 1,000 mcg, 1,000 mcg, Oral, Daily  0.9 % sodium chloride infusion, , Intravenous, Continuous  sodium chloride flush 0.9 % injection 10 mL, 10 mL, Intravenous, 2 times per day  sodium chloride flush 0.9 % injection 10 mL, 10 mL, Intravenous, PRN  acetaminophen (TYLENOL) tablet 650 mg, 650 mg, Oral, Q4H PRN  ondansetron (ZOFRAN) injection 4 mg, 4 mg, Intravenous, Q6H PRN  enoxaparin (LOVENOX) injection 30 mg, 30 mg, Subcutaneous, Daily  ipratropium-albuterol (DUONEB) nebulizer solution 1 ampule, 1 ampule, Inhalation, Q4H WA  cefepime (MAXIPIME) 1 g in dextrose 5 % 100 mL IVPB, 1 g, Intravenous, Q12H  guaiFENesin-dextromethorphan (ROBITUSSIN DM) 100-10 MG/5ML syrup 10 mL, 10 mL, Oral, 4x Daily  vancomycin 1000 mg IVPB in 250 mL D5W addavial, 1,000 mg, Intravenous, Q24H    IV MEDICATIONS:   sodium chloride 75 mL/hr at 03/18/18 2024       ALLERGIES:  No Known Allergies    REVIEW OF SYSTEMS:  General ROS:  Fever and   fatigue    ENT ROS:   No Sore throat ,no lymphoadenopathy,no nasal stuffiness     Hematological and Lymphatic ROS:   No ecchymosis ,no tendency to bleed  Respiratory ROS:    sob and cough   Cardiovascular ROS:   No CP,No Palpitation   Gastrointestinal ROS:   No Gi bleed,no nausea or vomiting      - Musculoskeletal ROS:      - no joint swelling ,no joint pain   Neurological ROS:     No focal deficit ,not sure about cognitive disorder    Dermatological ROS:   No skin rash ,no urticaria     PHYSICAL EXAMINATION:     VITAL SIGNS:  BP (!) 171/79   Pulse 98   Temp 98.3 °F (36.8 °C) (Oral)   Resp 20   Ht 5' 5\" (1.651 m)   Wt 171 lb (77.6 kg)   SpO2 94%   BMI 28.46 kg/m²   Wt Readings from Last 3 Encounters:   03/18/18 171 lb (77.6 kg)   09/13/16 175 lb (79.4 kg)   02/02/15 175 lb (79.4 kg)     Temp Readings from Last 3 Encounters:   03/18/18 98.3 °F (36.8 °C) (Oral)   09/16/16 97.8 °F (36.6 °C) (Oral)   02/09/15 98 °F (36.7 °C) (Oral)     TMAX:  BP Readings from Last 3 Encounters:   03/18/18 (!) 171/79   09/16/16 136/60   02/09/15 147/70     Pulse Readings from Last 3 Encounters:   03/18/18 98   09/16/16 74   02/09/15 89           INTAKE/OUTPUTS:  I/O last 3 completed shifts:  In: -   Out: 250 [Urine:250]    Intake/Output Summary (Last 24 hours) at 03/19/18 0000  Last data filed at 03/18/18 2031   Gross per 24 hour   Intake                0 ml   Output              250 ml   Net             -250 ml       General Appearance: alert and oriented to person, place and time, well-developed and   well-nourished, in no acute distress   Eyes: pupils equal, round, and reactive to light, extraocular eye movements intact, conjunctivae normal and sclera anicteric   Neck: neck supple and non tender without mass, no thyromegaly, no thyroid nodules and no cervical adenopathy

## 2018-03-20 LAB
BASOPHILS ABSOLUTE: 0.04 E9/L (ref 0–0.2)
BASOPHILS RELATIVE PERCENT: 0.4 % (ref 0–2)
EOSINOPHILS ABSOLUTE: 0.43 E9/L (ref 0.05–0.5)
EOSINOPHILS RELATIVE PERCENT: 3.9 % (ref 0–6)
FILM ARRAY ADENOVIRUS: NORMAL
FILM ARRAY BORDETELLA PERTUSSIS: NORMAL
FILM ARRAY CHLAMYDOPHILIA PNEUMONIAE: NORMAL
FILM ARRAY CORONAVIRUS 229E: NORMAL
FILM ARRAY CORONAVIRUS HKU1: NORMAL
FILM ARRAY CORONAVIRUS NL63: NORMAL
FILM ARRAY CORONAVIRUS OC43: NORMAL
FILM ARRAY INFLUENZA A VIRUS 09H1: NORMAL
FILM ARRAY INFLUENZA A VIRUS H1: NORMAL
FILM ARRAY INFLUENZA A VIRUS H3: NORMAL
FILM ARRAY INFLUENZA A VIRUS: NORMAL
FILM ARRAY INFLUENZA B: NORMAL
FILM ARRAY METAPNEUMOVIRUS: NORMAL
FILM ARRAY MYCOPLASMA PNEUMONIAE: NORMAL
FILM ARRAY PARAINFLUENZA VIRUS 1: NORMAL
FILM ARRAY PARAINFLUENZA VIRUS 2: NORMAL
FILM ARRAY PARAINFLUENZA VIRUS 3: NORMAL
FILM ARRAY PARAINFLUENZA VIRUS 4: NORMAL
FILM ARRAY RESPIRATORY SYNCITIAL VIRUS: NORMAL
FILM ARRAY RHINOVIRUS/ENTEROVIRUS: NORMAL
HCT VFR BLD CALC: 28.3 % (ref 34–48)
HEMOGLOBIN: 8.6 G/DL (ref 11.5–15.5)
IMMATURE GRANULOCYTES #: 0.07 E9/L
IMMATURE GRANULOCYTES %: 0.6 % (ref 0–5)
LYMPHOCYTES ABSOLUTE: 2.62 E9/L (ref 1.5–4)
LYMPHOCYTES RELATIVE PERCENT: 23.9 % (ref 20–42)
MCH RBC QN AUTO: 25.1 PG (ref 26–35)
MCHC RBC AUTO-ENTMCNC: 30.4 % (ref 32–34.5)
MCV RBC AUTO: 82.7 FL (ref 80–99.9)
MONOCYTES ABSOLUTE: 1.07 E9/L (ref 0.1–0.95)
MONOCYTES RELATIVE PERCENT: 9.8 % (ref 2–12)
NEUTROPHILS ABSOLUTE: 6.72 E9/L (ref 1.8–7.3)
NEUTROPHILS RELATIVE PERCENT: 61.4 % (ref 43–80)
PDW BLD-RTO: 16.1 FL (ref 11.5–15)
PLATELET # BLD: 313 E9/L (ref 130–450)
PMV BLD AUTO: 11.1 FL (ref 7–12)
RBC # BLD: 3.42 E12/L (ref 3.5–5.5)
STREP PNEUMONIAE ANTIGEN, URINE: NORMAL
URINE CULTURE, ROUTINE: NORMAL
WBC # BLD: 11 E9/L (ref 4.5–11.5)

## 2018-03-20 PROCEDURE — 85025 COMPLETE CBC W/AUTO DIFF WBC: CPT

## 2018-03-20 PROCEDURE — 2700000000 HC OXYGEN THERAPY PER DAY

## 2018-03-20 PROCEDURE — 94150 VITAL CAPACITY TEST: CPT

## 2018-03-20 PROCEDURE — 99232 SBSQ HOSP IP/OBS MODERATE 35: CPT | Performed by: INTERNAL MEDICINE

## 2018-03-20 PROCEDURE — 94640 AIRWAY INHALATION TREATMENT: CPT

## 2018-03-20 PROCEDURE — 2060000000 HC ICU INTERMEDIATE R&B

## 2018-03-20 PROCEDURE — 2580000003 HC RX 258: Performed by: INTERNAL MEDICINE

## 2018-03-20 PROCEDURE — 94760 N-INVAS EAR/PLS OXIMETRY 1: CPT

## 2018-03-20 PROCEDURE — 6370000000 HC RX 637 (ALT 250 FOR IP): Performed by: INTERNAL MEDICINE

## 2018-03-20 PROCEDURE — 6360000002 HC RX W HCPCS: Performed by: INTERNAL MEDICINE

## 2018-03-20 PROCEDURE — 36415 COLL VENOUS BLD VENIPUNCTURE: CPT

## 2018-03-20 RX ORDER — AMOXICILLIN AND CLAVULANATE POTASSIUM 875; 125 MG/1; MG/1
1 TABLET, FILM COATED ORAL EVERY 12 HOURS SCHEDULED
Status: DISCONTINUED | OUTPATIENT
Start: 2018-03-20 | End: 2018-03-22

## 2018-03-20 RX ADMIN — Medication 1000 MCG: at 09:34

## 2018-03-20 RX ADMIN — CEFEPIME HYDROCHLORIDE 1 G: 1 INJECTION, POWDER, FOR SOLUTION INTRAMUSCULAR; INTRAVENOUS at 13:22

## 2018-03-20 RX ADMIN — GUAIFENESIN AND DEXTROMETHORPHAN 10 ML: 100; 10 SYRUP ORAL at 13:21

## 2018-03-20 RX ADMIN — SODIUM CHLORIDE: 9 INJECTION, SOLUTION INTRAVENOUS at 20:23

## 2018-03-20 RX ADMIN — SUCRALFATE 1 G: 1 TABLET ORAL at 20:23

## 2018-03-20 RX ADMIN — PANTOPRAZOLE SODIUM 40 MG: 40 TABLET, DELAYED RELEASE ORAL at 09:39

## 2018-03-20 RX ADMIN — DOCUSATE SODIUM 100 MG: 100 CAPSULE, LIQUID FILLED ORAL at 20:23

## 2018-03-20 RX ADMIN — GUAIFENESIN AND DEXTROMETHORPHAN 10 ML: 100; 10 SYRUP ORAL at 20:23

## 2018-03-20 RX ADMIN — VANCOMYCIN HYDROCHLORIDE 1000 MG: 1 INJECTION, POWDER, LYOPHILIZED, FOR SOLUTION INTRAVENOUS at 16:23

## 2018-03-20 RX ADMIN — SUCRALFATE 1 G: 1 TABLET ORAL at 09:34

## 2018-03-20 RX ADMIN — SODIUM CHLORIDE 75 ML/HR: 9 INJECTION, SOLUTION INTRAVENOUS at 02:20

## 2018-03-20 RX ADMIN — AMITRIPTYLINE HYDROCHLORIDE 10 MG: 10 TABLET, FILM COATED ORAL at 20:23

## 2018-03-20 RX ADMIN — Medication 500 MG: at 20:23

## 2018-03-20 RX ADMIN — GUAIFENESIN AND DEXTROMETHORPHAN 10 ML: 100; 10 SYRUP ORAL at 16:23

## 2018-03-20 RX ADMIN — GUAIFENESIN AND DEXTROMETHORPHAN 10 ML: 100; 10 SYRUP ORAL at 04:14

## 2018-03-20 RX ADMIN — ENOXAPARIN SODIUM 30 MG: 30 INJECTION SUBCUTANEOUS at 09:34

## 2018-03-20 RX ADMIN — IPRATROPIUM BROMIDE AND ALBUTEROL SULFATE 1 AMPULE: 2.5; .5 SOLUTION RESPIRATORY (INHALATION) at 12:01

## 2018-03-20 RX ADMIN — DOCUSATE SODIUM 100 MG: 100 CAPSULE, LIQUID FILLED ORAL at 09:34

## 2018-03-20 RX ADMIN — Medication 500 MG: at 09:34

## 2018-03-20 RX ADMIN — CEFEPIME HYDROCHLORIDE 1 G: 1 INJECTION, POWDER, FOR SOLUTION INTRAMUSCULAR; INTRAVENOUS at 02:20

## 2018-03-20 RX ADMIN — IPRATROPIUM BROMIDE AND ALBUTEROL SULFATE 1 AMPULE: 2.5; .5 SOLUTION RESPIRATORY (INHALATION) at 19:27

## 2018-03-20 RX ADMIN — GUAIFENESIN AND DEXTROMETHORPHAN 10 ML: 100; 10 SYRUP ORAL at 09:34

## 2018-03-20 RX ADMIN — IPRATROPIUM BROMIDE AND ALBUTEROL SULFATE 1 AMPULE: 2.5; .5 SOLUTION RESPIRATORY (INHALATION) at 04:04

## 2018-03-20 RX ADMIN — METOPROLOL SUCCINATE 25 MG: 25 TABLET, FILM COATED, EXTENDED RELEASE ORAL at 09:34

## 2018-03-20 RX ADMIN — ACETAMINOPHEN 650 MG: 325 TABLET, FILM COATED ORAL at 04:14

## 2018-03-20 RX ADMIN — LEVOTHYROXINE SODIUM 200 MCG: 200 TABLET ORAL at 05:13

## 2018-03-20 RX ADMIN — IPRATROPIUM BROMIDE AND ALBUTEROL SULFATE 1 AMPULE: 2.5; .5 SOLUTION RESPIRATORY (INHALATION) at 08:11

## 2018-03-20 ASSESSMENT — PAIN SCALES - GENERAL
PAINLEVEL_OUTOF10: 3
PAINLEVEL_OUTOF10: 0
PAINLEVEL_OUTOF10: 3
PAINLEVEL_OUTOF10: 0

## 2018-03-20 ASSESSMENT — PAIN DESCRIPTION - LOCATION: LOCATION: HEAD

## 2018-03-20 ASSESSMENT — PAIN DESCRIPTION - PAIN TYPE: TYPE: ACUTE PAIN

## 2018-03-20 ASSESSMENT — PAIN DESCRIPTION - DESCRIPTORS: DESCRIPTORS: HEADACHE

## 2018-03-20 NOTE — PROGRESS NOTES
airspace opacities of the lung bases more pronounced on the left. There is a moderate size hiatal hernia. Upper abdomen is unremarkable. Mild diffuse osteopenia. Several healed rib fracture deformities of the left hemithorax in the axillary region similar to previously studies. Left humeral head replacement. 1. Borderline heart size, small pleural effusions and perihilar vascular congestion. 2. Mild to moderate airspace opacities and consolidation of the lower lungs more pronounced on the left may represent atelectasis or infiltrates. 3. Moderate size hiatal hernia. Fl Modified Barium Swallow W Video    Result Date: 3/19/2018  Patient MRN:  [id number] : 10/13/1920 Age: 80 years Gender: Female Order Date:  3/18/2018 5:15 PM EXAM: FL MODIFIED BARIUM SWALLOW W VIDEO COMPARISON: None INDICATION:  aspiration pneumonia aspiration pneumonia FINDINGS: Fluoroscopy time: 2.5 minutes. Images: 1 No evidence of aspiration or penetration. There is an oral delay. No retention seen in vallecula or piriform sinuses. No pharyngeal delay. Laryngeal elevation is adequate. No evidence of aspiration or penetration.        Scheduled Meds:   amitriptyline  10 mg Oral Nightly    calcium elemental  500 mg Oral BID    docusate sodium  100 mg Oral BID    furosemide  20 mg Oral Q MWF    levothyroxine  200 mcg Oral Daily    metoprolol succinate  25 mg Oral Daily    pantoprazole  40 mg Oral Daily    potassium chloride  10 mEq Oral Q MWF    sucralfate  1 g Oral BID    vitamin B-12  1,000 mcg Oral Daily    sodium chloride flush  10 mL Intravenous 2 times per day    enoxaparin  30 mg Subcutaneous Daily    ipratropium-albuterol  1 ampule Inhalation Q4H WA    cefepime  1 g Intravenous Q12H    guaiFENesin-dextromethorphan  10 mL Oral 4x Daily    vancomycin  1,000 mg Intravenous Q24H     Continuous Infusions:   sodium chloride 75 mL/hr (18 0220)     PRN Meds:.traMADol, sodium chloride flush, acetaminophen,

## 2018-03-20 NOTE — PROGRESS NOTES
course/culture data  · Pharmacist will follow and monitor/adjust dosing as necessary    Edward Frost PharmD, BCPS 3/20/2018 11:27 AM  Pager:  [phone]

## 2018-03-20 NOTE — PROGRESS NOTES
[facility]  Division of Pulmonary, Critical Care Medicine  Pulmonary [address] [facility]       Pulmonary Progress Note         Patient: Jerry Wall  MRN: [id number]  : 10/13/1920      Date of Admission: .3/18/2018 10:53 AM    Consulting Physician:Dr Levin         Reason for Consultation:  CC : cough and fever     HPI:   SOB has improved since admission with no fever or chills ,no chest pain . She is required less O2  No fever or chills ,no chest pain          PHYSICAL EXAMINATION:     VITAL SIGNS:  /70   Pulse 94   Temp 97.9 °F (36.6 °C) (Oral)   Resp 16   Ht 5' 5\" (1.651 m)   Wt 175 lb (79.4 kg)   SpO2 96%   BMI 29.12 kg/m²   Wt Readings from Last 3 Encounters:   18 175 lb (79.4 kg)   16 175 lb (79.4 kg)   02/02/15 175 lb (79.4 kg)     Temp Readings from Last 3 Encounters:   18 97.9 °F (36.6 °C) (Oral)   16 97.8 °F (36.6 °C) (Oral)   02/09/15 98 °F (36.7 °C) (Oral)     TMAX:  BP Readings from Last 3 Encounters:   18 132/70   16 136/60   02/09/15 147/70     Pulse Readings from Last 3 Encounters:   18 94   16 74   02/09/15 89           INTAKE/OUTPUTS:  I/O last 3 completed shifts: In: 5454 [P.O.:600;  I.V.:2337; IV Piggyback:350]  Out: 650 [Urine:650]    Intake/Output Summary (Last 24 hours) at 18 2338  Last data filed at 18 2232   Gross per 24 hour   Intake             3287 ml   Output              650 ml   Net             2637 ml       General Appearance: alert and oriented to person, place and time, well-developed and   well-nourished, in no acute distress   Eyes: pupils equal, round, and reactive to light, extraocular eye movements intact, conjunctivae normal and sclera anicteric   Neck: neck supple and non tender without mass, no thyromegaly, no thyroid nodules and no cervical adenopathy   Pulmonary/Chest:rhonchi bilateral   Cardiovascular: normal rate, regular rhythm,

## 2018-03-21 ENCOUNTER — APPOINTMENT (OUTPATIENT)
Dept: GENERAL RADIOLOGY | Age: 83
DRG: 179 | End: 2018-03-21
Payer: MEDICARE

## 2018-03-21 LAB
ANION GAP SERPL CALCULATED.3IONS-SCNC: 13 MMOL/L (ref 7–16)
BUN BLDV-MCNC: 7 MG/DL (ref 8–23)
CALCIUM SERPL-MCNC: 9.1 MG/DL (ref 8.6–10.2)
CHLORIDE BLD-SCNC: 98 MMOL/L (ref 98–107)
CO2: 24 MMOL/L (ref 22–29)
CREAT SERPL-MCNC: 0.7 MG/DL (ref 0.5–1)
GFR AFRICAN AMERICAN: >60
GFR NON-AFRICAN AMERICAN: >60 ML/MIN/1.73
GLUCOSE BLD-MCNC: 112 MG/DL (ref 74–109)
POTASSIUM SERPL-SCNC: 3.7 MMOL/L (ref 3.5–5)
SODIUM BLD-SCNC: 135 MMOL/L (ref 132–146)
VANCOMYCIN TROUGH: 7.5 MCG/ML (ref 5–16)

## 2018-03-21 PROCEDURE — 6360000002 HC RX W HCPCS: Performed by: INTERNAL MEDICINE

## 2018-03-21 PROCEDURE — 80202 ASSAY OF VANCOMYCIN: CPT

## 2018-03-21 PROCEDURE — 2580000003 HC RX 258: Performed by: INTERNAL MEDICINE

## 2018-03-21 PROCEDURE — 6370000000 HC RX 637 (ALT 250 FOR IP): Performed by: INTERNAL MEDICINE

## 2018-03-21 PROCEDURE — 94640 AIRWAY INHALATION TREATMENT: CPT

## 2018-03-21 PROCEDURE — 94760 N-INVAS EAR/PLS OXIMETRY 1: CPT

## 2018-03-21 PROCEDURE — 80048 BASIC METABOLIC PNL TOTAL CA: CPT

## 2018-03-21 PROCEDURE — 36415 COLL VENOUS BLD VENIPUNCTURE: CPT

## 2018-03-21 PROCEDURE — 71045 X-RAY EXAM CHEST 1 VIEW: CPT

## 2018-03-21 PROCEDURE — 99233 SBSQ HOSP IP/OBS HIGH 50: CPT | Performed by: INTERNAL MEDICINE

## 2018-03-21 PROCEDURE — 94150 VITAL CAPACITY TEST: CPT

## 2018-03-21 PROCEDURE — 2060000000 HC ICU INTERMEDIATE R&B

## 2018-03-21 PROCEDURE — 2580000003 HC RX 258

## 2018-03-21 PROCEDURE — 2700000000 HC OXYGEN THERAPY PER DAY

## 2018-03-21 RX ADMIN — FUROSEMIDE 20 MG: 20 TABLET ORAL at 09:03

## 2018-03-21 RX ADMIN — Medication 500 MG: at 09:03

## 2018-03-21 RX ADMIN — DOCUSATE SODIUM 100 MG: 100 CAPSULE, LIQUID FILLED ORAL at 21:25

## 2018-03-21 RX ADMIN — PANTOPRAZOLE SODIUM 40 MG: 40 TABLET, DELAYED RELEASE ORAL at 09:03

## 2018-03-21 RX ADMIN — SUCRALFATE 1 G: 1 TABLET ORAL at 21:25

## 2018-03-21 RX ADMIN — Medication 1000 MCG: at 09:03

## 2018-03-21 RX ADMIN — IPRATROPIUM BROMIDE AND ALBUTEROL SULFATE 1 AMPULE: 2.5; .5 SOLUTION RESPIRATORY (INHALATION) at 12:55

## 2018-03-21 RX ADMIN — ENOXAPARIN SODIUM 30 MG: 30 INJECTION SUBCUTANEOUS at 09:02

## 2018-03-21 RX ADMIN — POTASSIUM CHLORIDE 10 MEQ: 10 TABLET, EXTENDED RELEASE ORAL at 09:03

## 2018-03-21 RX ADMIN — IPRATROPIUM BROMIDE AND ALBUTEROL SULFATE 1 AMPULE: 2.5; .5 SOLUTION RESPIRATORY (INHALATION) at 09:18

## 2018-03-21 RX ADMIN — DOCUSATE SODIUM 100 MG: 100 CAPSULE, LIQUID FILLED ORAL at 09:03

## 2018-03-21 RX ADMIN — METOPROLOL SUCCINATE 25 MG: 25 TABLET, FILM COATED, EXTENDED RELEASE ORAL at 09:03

## 2018-03-21 RX ADMIN — IPRATROPIUM BROMIDE AND ALBUTEROL SULFATE 1 AMPULE: 2.5; .5 SOLUTION RESPIRATORY (INHALATION) at 17:34

## 2018-03-21 RX ADMIN — LEVOTHYROXINE SODIUM 200 MCG: 200 TABLET ORAL at 05:04

## 2018-03-21 RX ADMIN — GUAIFENESIN AND DEXTROMETHORPHAN 10 ML: 100; 10 SYRUP ORAL at 21:25

## 2018-03-21 RX ADMIN — SUCRALFATE 1 G: 1 TABLET ORAL at 09:03

## 2018-03-21 RX ADMIN — VANCOMYCIN HYDROCHLORIDE 1000 MG: 1 INJECTION, POWDER, LYOPHILIZED, FOR SOLUTION INTRAVENOUS at 21:25

## 2018-03-21 RX ADMIN — AMITRIPTYLINE HYDROCHLORIDE 10 MG: 10 TABLET, FILM COATED ORAL at 21:25

## 2018-03-21 RX ADMIN — GUAIFENESIN AND DEXTROMETHORPHAN 10 ML: 100; 10 SYRUP ORAL at 09:03

## 2018-03-21 RX ADMIN — AMOXICILLIN AND CLAVULANATE POTASSIUM 1 TABLET: 875; 125 TABLET, FILM COATED ORAL at 00:05

## 2018-03-21 RX ADMIN — GUAIFENESIN AND DEXTROMETHORPHAN 10 ML: 100; 10 SYRUP ORAL at 14:20

## 2018-03-21 RX ADMIN — Medication 10 ML: at 21:25

## 2018-03-21 RX ADMIN — IPRATROPIUM BROMIDE AND ALBUTEROL SULFATE 1 AMPULE: 2.5; .5 SOLUTION RESPIRATORY (INHALATION) at 21:21

## 2018-03-21 RX ADMIN — AMOXICILLIN AND CLAVULANATE POTASSIUM 1 TABLET: 875; 125 TABLET, FILM COATED ORAL at 21:25

## 2018-03-21 RX ADMIN — AMOXICILLIN AND CLAVULANATE POTASSIUM 1 TABLET: 875; 125 TABLET, FILM COATED ORAL at 09:03

## 2018-03-21 RX ADMIN — Medication 500 MG: at 21:25

## 2018-03-21 ASSESSMENT — PAIN SCALES - GENERAL
PAINLEVEL_OUTOF10: 0

## 2018-03-21 NOTE — PROGRESS NOTES
Clinical Pharmacy Note:    Vancomycin has been discontinued.   Clinical pharmacy will sign off.     Favian Zhou PharmD, BCPS 3/21/2018 8:32 AM  Pager:  [phone]

## 2018-03-21 NOTE — PROGRESS NOTES
[facility]  Division of Pulmonary, Critical Care Medicine  Pulmonary [address] [facility]       Pulmonary Progress Note         Patient: Ekaterina Martinez  MRN: [id number]  : 10/13/1920      Date of Admission: .3/18/2018 10:53 AM    Consulting Physician:Dr Levin         Reason for Consultation:  CC : cough and fever      HPI:     Continue to feel better  Has some cough   No fever or chills still on 4 L   Has some yellow sputum   She remain confused    PHYSICAL EXAMINATION:     VITAL SIGNS:  BP (!) 144/62   Pulse 98   Temp 98.6 °F (37 °C) (Oral)   Resp 18   Ht 5' 5\" (1.651 m)   Wt 175 lb 1.6 oz (79.4 kg)   SpO2 98%   BMI 29.14 kg/m²   Wt Readings from Last 3 Encounters:   18 175 lb 1.6 oz (79.4 kg)   16 175 lb (79.4 kg)   02/02/15 175 lb (79.4 kg)     Temp Readings from Last 3 Encounters:   18 98.6 °F (37 °C) (Oral)   16 97.8 °F (36.6 °C) (Oral)   02/09/15 98 °F (36.7 °C) (Oral)     TMAX:  BP Readings from Last 3 Encounters:   18 (!) 144/62   16 136/60   02/09/15 147/70     Pulse Readings from Last 3 Encounters:   18 98   16 74   02/09/15 89           INTAKE/OUTPUTS:  I/O last 3 completed shifts: In: 2017.7 [P.O.:1040;  I.V.:977.7]  Out: 1750 [Urine:1750]    Intake/Output Summary (Last 24 hours) at 18 1638  Last data filed at 18 1436   Gross per 24 hour   Intake          1897.67 ml   Output             1450 ml   Net           447.67 ml       General Appearance: alert and oriented to person, place and time, well-developed and   well-nourished, in no acute distress   Eyes: pupils equal, round, and reactive to light, extraocular eye movements intact, conjunctivae normal and sclera anicteric   Neck: neck supple and non tender without mass, no thyromegaly, no thyroid nodules and no cervical adenopathy   Pulmonary/Chest:rhonchi bilateral   Cardiovascular: normal rate, regular rhythm, normal S1 and

## 2018-03-21 NOTE — PROGRESS NOTES
Pharmacy Consultation Note  (Antibiotic Dosing and Monitoring)    Initial consult date: 3/18/18  Consulting physician: Dr. rIais Mckeon  Drug(s): Vancomycin   Indication: Pneumonia    BR is a 80year old female starting on empiric antibiotics due to concern for pneumonia. Pharmacy was consulted to assist with dosing of vancomycin. Ht Readings from Last 1 Encounters:   18 5' 5\" (1.651 m)       Age/Gender IBW DW  Allergy Information   80 y.o.  female 57 kg 79.4 kg   Patient has no known allergies. Date  WBC BUN/SCr Drug/Dose Time   Given Level(s)   (Time) Comments   3/18  (#1) 17.0 18/1.0 Vancomycin 1.25g IV x1 1542     3/19  (#2) 14.1 13/0.7 Vancomycin 1g IV q24h 1650     3/20  (#3) -- -- Vancomycin 1g IV q24h 1623     3/21  (#4) 11.0 7/0.7         (#5)           (#6)           Other Antibiotics/Antifungals/Antivirals Start Date Stop Date Comments   Cefepime 2 mg x1, then cefepime 1g IV Q12h                          Estimated CrCL:  ~30-35 mL/min    Intake/Output Summary (Last 24 hours) at 18 1805  Last data filed at 18 1436   Gross per 24 hour   Intake          1897.67 ml   Output             1450 ml   Net           447.67 ml   Incomplete documentation     Temp max: Temp (24hrs), Av °F (36.7 °C), Min:97.4 °F (36.3 °C), Max:98.6 °F (37 °C)    Cultures:    Culture Date Result    Rapid Influenza 3/18 Negative   Blood Cxs 3/18 No growth to date   Resp Cx 3/18 Moderate polys, rare epis, few GP diplococci;Oral jairo reduced  S. Aureus    Urine Cx 3/18 <10,000 cfu/mL GP organism       Assessment:  · Consulted by Dr. Andrei Dixon to dose/monitor vancomycin for possible HCAP  · Patient presented with cough and fever. Chest Xray revealed and moderate air opacities. · Goal trough level:  15-20 mcg/mL  · SCr 0.7 mg/dL today  · Resp cx demonstrate S. Aureus     Plan:  · Vanco consult re-added for staph growing in resp cx.  Will check trough at this time and then restart appropriate

## 2018-03-21 NOTE — PLAN OF CARE
Problem: Falls - Risk of  Goal: Absence of falls  Outcome: Met This Shift      Problem: Airway Clearance - Ineffective:  Goal: Ability to maintain a clear airway will improve  Ability to maintain a clear airway will improve   Outcome: Met This Shift

## 2018-03-21 NOTE — PROGRESS NOTES
S1 and S2, no murmurs, rubs, clicks or gallops, distal pulses intact, no carotid bruits, no murmurs, no gallops, no carotid bruits and no JVD   Abdomen: obese, soft, non-tender, non-distended, normal bowel sounds, no masses or organomegaly   Extremities:no edema ,no cyanosis   Musculoskeletal: normal range of motion, no joint swelling, deformity or tenderness   Neurologic: reflexes normal and symmetric, no cranial nerve deficit noted    LABS/IMAGING:    CBC:  Lab Results   Component Value Date    WBC 11.0 03/20/2018    HGB 8.6 (L) 03/20/2018    HCT 28.3 (L) 03/20/2018    MCV 82.7 03/20/2018     03/20/2018    LYMPHOPCT 23.9 03/20/2018    RBC 3.42 (L) 03/20/2018    MCH 25.1 (L) 03/20/2018    MCHC 30.4 (L) 03/20/2018    RDW 16.1 (H) 03/20/2018    NEUTOPHILPCT 61.4 03/20/2018    MONOPCT 9.8 03/20/2018    BASOPCT 0.4 03/20/2018    NEUTROABS 6.72 03/20/2018    LYMPHSABS 2.62 03/20/2018    MONOSABS 1.07 (H) 03/20/2018    EOSABS 0.43 03/20/2018    BASOSABS 0.04 03/20/2018       Recent Labs      03/20/18   1814  03/19/18   0717  03/18/18   1126   WBC  11.0  14.1*  17.0*   HGB  8.6*  9.5*  9.3*   HCT  28.3*  32.1*  31.6*   MCV  82.7  83.8  81.0   PLT  313  272  344       BMP:   Recent Labs      03/18/18   1126  03/19/18   0717   NA  141  141   K  3.8  4.0   CL  102  101   CO2  26  23   BUN  18  13   CREATININE  1.0  0.7       MG:   Lab Results   Component Value Date    MG 1.8 09/16/2016     Ca/Phos:   Lab Results   Component Value Date    CALCIUM 9.1 03/19/2018     Amylase: No results found for: AMYLASE  Lipase:   Lab Results   Component Value Date    LIPASE 14 03/18/2018     LIVER PROFILE:   Recent Labs      03/18/18   1126   AST  35*   ALT  25   LIPASE  14   BILITOT  0.6   ALKPHOS  136*       PT/INR: No results for input(s): PROTIME, INR in the last 72 hours. APTT: No results for input(s): APTT in the last 72 hours.     Cardiac Enzymes:  Lab Results   Component Value Date    CKTOTAL 92 03/13/2012    CKMB <0.2

## 2018-03-22 LAB
CULTURE, RESPIRATORY: ABNORMAL
CULTURE, RESPIRATORY: ABNORMAL
ORGANISM: ABNORMAL
SMEAR, RESPIRATORY: ABNORMAL

## 2018-03-22 PROCEDURE — 6360000002 HC RX W HCPCS: Performed by: INTERNAL MEDICINE

## 2018-03-22 PROCEDURE — 2060000000 HC ICU INTERMEDIATE R&B

## 2018-03-22 PROCEDURE — 2580000003 HC RX 258: Performed by: INTERNAL MEDICINE

## 2018-03-22 PROCEDURE — 94150 VITAL CAPACITY TEST: CPT

## 2018-03-22 PROCEDURE — 2700000000 HC OXYGEN THERAPY PER DAY

## 2018-03-22 PROCEDURE — 94640 AIRWAY INHALATION TREATMENT: CPT

## 2018-03-22 PROCEDURE — 6370000000 HC RX 637 (ALT 250 FOR IP): Performed by: INTERNAL MEDICINE

## 2018-03-22 PROCEDURE — 99232 SBSQ HOSP IP/OBS MODERATE 35: CPT | Performed by: INTERNAL MEDICINE

## 2018-03-22 RX ORDER — GUAIFENESIN/DEXTROMETHORPHAN 100-10MG/5
10 SYRUP ORAL 4 TIMES DAILY
Qty: 120 ML | DISCHARGE
Start: 2018-03-22 | End: 2018-04-01

## 2018-03-22 RX ORDER — IPRATROPIUM BROMIDE AND ALBUTEROL SULFATE 2.5; .5 MG/3ML; MG/3ML
3 SOLUTION RESPIRATORY (INHALATION)
Qty: 360 ML | DISCHARGE
Start: 2018-03-22

## 2018-03-22 RX ADMIN — IPRATROPIUM BROMIDE AND ALBUTEROL SULFATE 1 AMPULE: 2.5; .5 SOLUTION RESPIRATORY (INHALATION) at 20:21

## 2018-03-22 RX ADMIN — TRAMADOL HYDROCHLORIDE 50 MG: 50 TABLET, FILM COATED ORAL at 23:14

## 2018-03-22 RX ADMIN — Medication 10 ML: at 09:28

## 2018-03-22 RX ADMIN — VANCOMYCIN HYDROCHLORIDE 1000 MG: 1 INJECTION, POWDER, LYOPHILIZED, FOR SOLUTION INTRAVENOUS at 09:28

## 2018-03-22 RX ADMIN — IPRATROPIUM BROMIDE AND ALBUTEROL SULFATE 1 AMPULE: 2.5; .5 SOLUTION RESPIRATORY (INHALATION) at 16:00

## 2018-03-22 RX ADMIN — IPRATROPIUM BROMIDE AND ALBUTEROL SULFATE 1 AMPULE: 2.5; .5 SOLUTION RESPIRATORY (INHALATION) at 12:53

## 2018-03-22 RX ADMIN — DOCUSATE SODIUM 100 MG: 100 CAPSULE, LIQUID FILLED ORAL at 23:03

## 2018-03-22 RX ADMIN — VANCOMYCIN HYDROCHLORIDE 1000 MG: 1 INJECTION, POWDER, LYOPHILIZED, FOR SOLUTION INTRAVENOUS at 23:01

## 2018-03-22 RX ADMIN — GUAIFENESIN AND DEXTROMETHORPHAN 10 ML: 100; 10 SYRUP ORAL at 23:03

## 2018-03-22 RX ADMIN — ENOXAPARIN SODIUM 30 MG: 30 INJECTION SUBCUTANEOUS at 09:28

## 2018-03-22 RX ADMIN — Medication 500 MG: at 09:28

## 2018-03-22 RX ADMIN — PANTOPRAZOLE SODIUM 40 MG: 40 TABLET, DELAYED RELEASE ORAL at 09:28

## 2018-03-22 RX ADMIN — DOCUSATE SODIUM 100 MG: 100 CAPSULE, LIQUID FILLED ORAL at 09:28

## 2018-03-22 RX ADMIN — SUCRALFATE 1 G: 1 TABLET ORAL at 09:28

## 2018-03-22 RX ADMIN — IPRATROPIUM BROMIDE AND ALBUTEROL SULFATE 1 AMPULE: 2.5; .5 SOLUTION RESPIRATORY (INHALATION) at 09:03

## 2018-03-22 RX ADMIN — METOPROLOL SUCCINATE 25 MG: 25 TABLET, FILM COATED, EXTENDED RELEASE ORAL at 09:28

## 2018-03-22 RX ADMIN — GUAIFENESIN AND DEXTROMETHORPHAN 10 ML: 100; 10 SYRUP ORAL at 16:51

## 2018-03-22 RX ADMIN — GUAIFENESIN AND DEXTROMETHORPHAN 10 ML: 100; 10 SYRUP ORAL at 09:28

## 2018-03-22 RX ADMIN — Medication 500 MG: at 23:04

## 2018-03-22 RX ADMIN — AMOXICILLIN AND CLAVULANATE POTASSIUM 1 TABLET: 875; 125 TABLET, FILM COATED ORAL at 09:28

## 2018-03-22 RX ADMIN — SUCRALFATE 1 G: 1 TABLET ORAL at 23:04

## 2018-03-22 RX ADMIN — AMITRIPTYLINE HYDROCHLORIDE 10 MG: 10 TABLET, FILM COATED ORAL at 23:04

## 2018-03-22 RX ADMIN — SODIUM CHLORIDE: 9 INJECTION, SOLUTION INTRAVENOUS at 09:30

## 2018-03-22 RX ADMIN — Medication 1000 MCG: at 09:28

## 2018-03-22 RX ADMIN — LEVOTHYROXINE SODIUM 200 MCG: 200 TABLET ORAL at 06:07

## 2018-03-22 ASSESSMENT — PAIN DESCRIPTION - PAIN TYPE: TYPE: ACUTE PAIN

## 2018-03-22 ASSESSMENT — PAIN SCALES - GENERAL
PAINLEVEL_OUTOF10: 0
PAINLEVEL_OUTOF10: 7

## 2018-03-22 ASSESSMENT — PAIN DESCRIPTION - LOCATION: LOCATION: BACK

## 2018-03-22 NOTE — PROGRESS NOTES
There is a moderate size hiatal hernia. Upper abdomen is unremarkable. Mild diffuse osteopenia. Several healed rib fracture deformities of the left hemithorax in the axillary region similar to previously studies. Left humeral head replacement. 1. Borderline heart size, small pleural effusions and perihilar vascular congestion. 2. Mild to moderate airspace opacities and consolidation of the lower lungs more pronounced on the left may represent atelectasis or infiltrates. 3. Moderate size hiatal hernia. Fl Modified Barium Swallow W Video    Result Date: 3/19/2018  Patient MRN:  [id number] : 10/13/1920 Age: 80 years Gender: Female Order Date:  3/18/2018 5:15 PM EXAM: FL MODIFIED BARIUM SWALLOW W VIDEO COMPARISON: None INDICATION:  aspiration pneumonia aspiration pneumonia FINDINGS: Fluoroscopy time: 2.5 minutes. Images: 1 No evidence of aspiration or penetration. There is an oral delay. No retention seen in vallecula or piriform sinuses. No pharyngeal delay. Laryngeal elevation is adequate. No evidence of aspiration or penetration. Scheduled Meds:   vancomycin  1,000 mg Intravenous Q12H    amoxicillin-clavulanate  1 tablet Oral 2 times per day    amitriptyline  10 mg Oral Nightly    calcium elemental  500 mg Oral BID    docusate sodium  100 mg Oral BID    furosemide  20 mg Oral Q MWF    levothyroxine  200 mcg Oral Daily    metoprolol succinate  25 mg Oral Daily    pantoprazole  40 mg Oral Daily    potassium chloride  10 mEq Oral Q MWF    sucralfate  1 g Oral BID    vitamin B-12  1,000 mcg Oral Daily    sodium chloride flush  10 mL Intravenous 2 times per day    enoxaparin  30 mg Subcutaneous Daily    ipratropium-albuterol  1 ampule Inhalation Q4H WA    guaiFENesin-dextromethorphan  10 mL Oral 4x Daily     Continuous Infusions:   sodium chloride 50 mL/hr at 18 0930     PRN Meds:.traMADol, sodium chloride flush, acetaminophen, ondansetron    I/O last 3 completed shifts:   In: 1303 [P.O.:480; I.V.:573; IV Piggyback:250]  Out: 1000 [Urine:1000]  I/O this shift:  In: 60 [P.O.:60]  Out: -     Intake/Output Summary (Last 24 hours) at 03/22/18 1301  Last data filed at 03/22/18 1034   Gross per 24 hour   Intake             1183 ml   Output             1000 ml   Net              183 ml       Assessment:    Active Hospital Problems    Diagnosis    Hypothyroid [E03.9]     Priority: High     Class: Chronic    Alzheimer's dementia [G30.9]    Oropharyngeal dysphagia [R13.12]    HCAP (healthcare-associated pneumonia) [J18.9]       Plan:  Pulmonary consult reviewed             MBS reviewed--diet started             BP controlled             Afebrile              Clinically improved             CBC reviewed--WBC nl             Sputum Staph aureus--sensitivity noted             CXR reviewed--persistent infiltrates             Discharge when cleared with pulmonary                       Mike Levin DO  1:01 PM  3/22/2018

## 2018-03-22 NOTE — PROGRESS NOTES
[facility]  Division of Pulmonary, Critical Care Medicine  Pulmonary [address] [facility]       Pulmonary Progress Note         Patient: Ofelia Smart  MRN: [id number]  : 10/13/1920      Date of Admission: .3/18/2018 10:53 AM    Consulting Physician:Dr Levin         Reason for Consultation:  CC : cough and fever      HPI:     Doing much better  Sputum shows MRSA   Still with some sputum     PHYSICAL EXAMINATION:     VITAL SIGNS:  BP (!) 172/75   Pulse 82   Temp 97.4 °F (36.3 °C) (Temporal)   Resp 18   Ht 5' 5\" (1.651 m)   Wt 175 lb 14 oz (79.8 kg)   SpO2 95%   BMI 29.27 kg/m²   Wt Readings from Last 3 Encounters:   18 175 lb 14 oz (79.8 kg)   16 175 lb (79.4 kg)   02/02/15 175 lb (79.4 kg)     Temp Readings from Last 3 Encounters:   18 97.4 °F (36.3 °C) (Temporal)   16 97.8 °F (36.6 °C) (Oral)   02/09/15 98 °F (36.7 °C) (Oral)     TMAX:  BP Readings from Last 3 Encounters:   18 (!) 172/75   16 136/60   02/09/15 147/70     Pulse Readings from Last 3 Encounters:   18 82   16 74   02/09/15 89           INTAKE/OUTPUTS:  I/O last 3 completed shifts: In: 1676 [P.O.:240;  I.V.:573; IV Piggyback:250]  Out: 1050 [Urine:1050]    Intake/Output Summary (Last 24 hours) at 18 1650  Last data filed at 18 1441   Gross per 24 hour   Intake             1063 ml   Output             1050 ml   Net               13 ml       General Appearance: alert and oriented to person, place and time, well-developed and   well-nourished, in no acute distress   Eyes: pupils equal, round, and reactive to light, extraocular eye movements intact, conjunctivae normal and sclera anicteric   Neck: neck supple and non tender without mass, no thyromegaly, no thyroid nodules and no cervical adenopathy   Pulmonary/Chest:rhonchi bilateral   Cardiovascular: normal rate, regular rhythm, normal S1 and S2, no murmurs, rubs, clicks or

## 2018-03-23 VITALS
OXYGEN SATURATION: 95 % | SYSTOLIC BLOOD PRESSURE: 142 MMHG | RESPIRATION RATE: 18 BRPM | BODY MASS INDEX: 29.42 KG/M2 | WEIGHT: 176.56 LBS | HEIGHT: 65 IN | TEMPERATURE: 97.4 F | DIASTOLIC BLOOD PRESSURE: 70 MMHG | HEART RATE: 80 BPM

## 2018-03-23 LAB
BLOOD CULTURE, ROUTINE: NORMAL
CULTURE, BLOOD 2: NORMAL

## 2018-03-23 PROCEDURE — 2700000000 HC OXYGEN THERAPY PER DAY

## 2018-03-23 PROCEDURE — 2580000003 HC RX 258: Performed by: INTERNAL MEDICINE

## 2018-03-23 PROCEDURE — 6360000002 HC RX W HCPCS: Performed by: INTERNAL MEDICINE

## 2018-03-23 PROCEDURE — 94150 VITAL CAPACITY TEST: CPT

## 2018-03-23 PROCEDURE — 94640 AIRWAY INHALATION TREATMENT: CPT

## 2018-03-23 PROCEDURE — 6370000000 HC RX 637 (ALT 250 FOR IP): Performed by: INTERNAL MEDICINE

## 2018-03-23 PROCEDURE — 94760 N-INVAS EAR/PLS OXIMETRY 1: CPT

## 2018-03-23 RX ORDER — CEFDINIR 300 MG/1
300 CAPSULE ORAL 2 TIMES DAILY
Qty: 10 CAPSULE | Refills: 0 | DISCHARGE
Start: 2018-03-23 | End: 2018-03-28

## 2018-03-23 RX ORDER — DOXYCYCLINE HYCLATE 100 MG
100 TABLET ORAL 2 TIMES DAILY
Qty: 20 TABLET | Refills: 0 | DISCHARGE
Start: 2018-03-23 | End: 2018-04-02

## 2018-03-23 RX ADMIN — PANTOPRAZOLE SODIUM 40 MG: 40 TABLET, DELAYED RELEASE ORAL at 09:16

## 2018-03-23 RX ADMIN — ENOXAPARIN SODIUM 30 MG: 30 INJECTION SUBCUTANEOUS at 09:15

## 2018-03-23 RX ADMIN — Medication 500 MG: at 09:16

## 2018-03-23 RX ADMIN — GUAIFENESIN AND DEXTROMETHORPHAN 10 ML: 100; 10 SYRUP ORAL at 09:16

## 2018-03-23 RX ADMIN — DOCUSATE SODIUM 100 MG: 100 CAPSULE, LIQUID FILLED ORAL at 09:17

## 2018-03-23 RX ADMIN — METOPROLOL SUCCINATE 25 MG: 25 TABLET, FILM COATED, EXTENDED RELEASE ORAL at 09:16

## 2018-03-23 RX ADMIN — Medication 1000 MCG: at 09:16

## 2018-03-23 RX ADMIN — FUROSEMIDE 20 MG: 20 TABLET ORAL at 09:17

## 2018-03-23 RX ADMIN — IPRATROPIUM BROMIDE AND ALBUTEROL SULFATE 1 AMPULE: 2.5; .5 SOLUTION RESPIRATORY (INHALATION) at 17:02

## 2018-03-23 RX ADMIN — SUCRALFATE 1 G: 1 TABLET ORAL at 09:16

## 2018-03-23 RX ADMIN — POTASSIUM CHLORIDE 10 MEQ: 10 TABLET, EXTENDED RELEASE ORAL at 09:16

## 2018-03-23 RX ADMIN — CEFAZOLIN SODIUM 2 G: 2 SOLUTION INTRAVENOUS at 12:06

## 2018-03-23 RX ADMIN — SODIUM CHLORIDE: 9 INJECTION, SOLUTION INTRAVENOUS at 07:04

## 2018-03-23 RX ADMIN — IPRATROPIUM BROMIDE AND ALBUTEROL SULFATE 1 AMPULE: 2.5; .5 SOLUTION RESPIRATORY (INHALATION) at 14:17

## 2018-03-23 RX ADMIN — LEVOTHYROXINE SODIUM 200 MCG: 200 TABLET ORAL at 07:02

## 2018-03-23 RX ADMIN — IPRATROPIUM BROMIDE AND ALBUTEROL SULFATE 1 AMPULE: 2.5; .5 SOLUTION RESPIRATORY (INHALATION) at 10:44

## 2018-03-23 ASSESSMENT — PAIN DESCRIPTION - ONSET: ONSET: ON-GOING

## 2018-03-23 ASSESSMENT — PAIN DESCRIPTION - PROGRESSION: CLINICAL_PROGRESSION: NOT CHANGED

## 2018-03-23 ASSESSMENT — PAIN DESCRIPTION - FREQUENCY: FREQUENCY: INTERMITTENT

## 2018-03-23 ASSESSMENT — PAIN DESCRIPTION - PAIN TYPE: TYPE: CHRONIC PAIN

## 2018-03-23 ASSESSMENT — PAIN DESCRIPTION - LOCATION: LOCATION: BACK

## 2018-03-23 ASSESSMENT — PAIN DESCRIPTION - ORIENTATION: ORIENTATION: MID;LOWER

## 2018-03-23 ASSESSMENT — PAIN SCALES - GENERAL: PAINLEVEL_OUTOF10: 3

## 2018-03-23 ASSESSMENT — PAIN DESCRIPTION - DESCRIPTORS: DESCRIPTORS: DULL

## 2018-03-23 NOTE — PROGRESS NOTES
Chief Complaint:  pneumonia    Subjective: The patient is awake. No new problems reported overnight. Denies chest pain & SOB . Denies abdominal pain. Tolerating diet. No nausea or vomiting. Objective:    Vitals:    18 0237   BP: 128/78   Pulse: 90   Resp:    Temp:    SpO2:      Awake, moist cough imporved, NAD  Heart:  RRR, no murmurs, gallops, or rubs. Lungs:  Diminished  bilaterally, no wheeze, rales bases, no  rhonchi  Abd: bowel sounds present, nontender, nondistended, no masses  Extrem:  No clubbing, cyanosis, or edema  Tele: NSR    Lab Results   Component Value Date     2018    K 3.7 2018    K 4.0 2018    CL 98 2018    CO2 24 2018    BUN 7 2018    CREATININE 0.7 2018    CALCIUM 9.1 2018      Lab Results   Component Value Date    WBC 11.0 2018    RBC 3.42 2018    HGB 8.6 2018    HCT 28.3 2018    MCV 82.7 2018    MCH 25.1 2018    MCHC 30.4 2018    RDW 16.1 2018     2018    MPV 11.1 2018     PT/INR:    Lab Results   Component Value Date    PROTIME 12.8 2016    INR 1.1 2016     No results for input(s): POCGLU in the last 72 hours. Recent Labs      18   0551   NA  135   K  3.7   CL  98   CO2  24   BUN  7*   CREATININE  0.7   CALCIUM  9.1   GFRAA  >60   LABGLOM  >60   GLUCOSE  112*       Xr Chest Portable    Result Date: 3/18/2018  Patient MRN:  [id number] : 10/13/1920 Age: 80 years Gender: Female Order Date:  3/18/2018 11:30 AM EXAM: XR CHEST PORTABLE NUMBER OF IMAGES:  1 view INDICATION:  cough, fever COMPARISON: Chest x-ray 2016 FINDINGS:  Cardiac silhouette appears at upper limits of normal. Perihilar vascular congestion. Atherosclerotic calcifications of the aortic arch. Blunting of the costophrenic angles could represent tiny pleural effusions. No pneumothorax. Mild airspace opacities of the lung bases more pronounced on the left.  There is

## 2018-03-23 NOTE — PROGRESS NOTES
Clinical Pharmacy Note    Vancomycin has been discontinued by ID.   Clinical Pharmacy will sign off.    Alexsander Bowles PharmD, BCPS 3/23/2018 9:44 AM  Pager:  [phone]

## 2018-03-23 NOTE — CARE COORDINATION
Patient to discharge to [facility] at 5p. Via [employer]. Spoke with Mellissa Drummond via phone and notified her of discharge and time. She is in agreement. Envelope and ambulance form on soft chart.

## 2018-03-23 NOTE — PLAN OF CARE
Problem: Nutrition  Goal: Optimal nutrition therapy  Outcome: Ongoing  Nutrition Problem: Inadequate oral intake  Nutritional Goals: Consume >50% meals/ONS

## 2018-03-23 NOTE — CONSULTS
[facility]     Consult Note        Date:   3/23/2018  Patient name:  Maggie Albert  Date of admission:  3/18/2018 10:53 AM  MRN:   [id number]  YOB: 1920    Reason for Consult:      Consulting Physician:      CC:   Chief Complaint   Patient presents with    Cough     congestion with fever since yesterday       HISTORY OF PRESENT ILLNESS:                The patient is a 80 y.o. Presented with SOB and and cough that has been going on for the last few days along with yellow sputum and fever ,Chills ,she states she has not been feeling well for the last few days before admission . Pt is a poor historian, data obtained is from EMR. CXR shows bilateral pleural effusions with diffuse airspace disease involving  the lower lobes bilaterally, left worse than right, resp cultures-MSSA, t/jessy , got 5 days vancomycin , cefepime for 3 days followed by augmentin. ID cosmulted for further management, pulmonary has been folllowing, plan is [facility]        Past Medical History:   has a past medical history of Acid reflux; Acute blood loss anemia; Acute Omi ulcer; Alzheimer's dementia; B12 deficiency; Gastrointestinal hemorrhage associated with gastritis; Hypothyroid; Intertrochanteric fracture of right hip (HCC); MCI (mild cognitive impairment); MCI (mild cognitive impairment) with memory loss; Non-rheumatic aortic sclerosis (HCC); OA (osteoarthritis) of ankle; Oropharyngeal dysphagia; and Thyroid disease. Past Surgical History:   has a past surgical history that includes back surgery; Cardiac surgery; hip surgery; Hysterectomy; hip surgery (Right, [id number]); and Upper gastrointestinal endoscopy (09/15/2016). Home Medications:    Prior to Admission medications    Medication Sig Start Date End Date Taking?  Authorizing Provider   ipratropium-albuterol (DUONEB) 0.5-2.5 (3) MG/3ML SOLN nebulizer solution Inhale 3 mLs into the lungs every 4 hours (while awake) 3/22/18  Yes Mike Levin, DO   guaiFENesin-dextromethorphan (ROBITUSSIN DM) 100-10 MG/5ML syrup Take 10 mLs by mouth 4 times daily for 10 days 3/22/18 4/1/18 Yes Mike Levin, DO   sucralfate (CARAFATE) 1 GM tablet Take 1 g by mouth 2 times daily   Yes Historical Provider, MD   amitriptyline (ELAVIL) 10 MG tablet Take 10 mg by mouth nightly   Yes Historical Provider, MD   aluminum & magnesium hydroxide-simethicone (MYLANTA) 400-400-40 MG/5ML SUSP Take 30 mLs by mouth every 4 hours as needed   Yes Historical Provider, MD   pantoprazole (PROTONIX) 40 MG tablet Take 1 tablet by mouth daily 9/16/16  Yes Marlon Weiner, DO   levothyroxine (SYNTHROID) 200 MCG tablet Take 200 mcg by mouth Daily   Yes Historical Provider, MD   furosemide (LASIX) 20 MG tablet Take 20 mg by mouth See Admin Instructions Mon, Wed, Fri ONLY   Yes Historical Provider, MD   potassium chloride (MICRO-K) 10 MEQ extended release capsule Take 10 mEq by mouth See Admin Instructions Mon, Wed, Fri ONLY   Yes Historical Provider, MD   docusate sodium (COLACE) 100 MG capsule Take 100 mg by mouth 2 times daily Indications: Constipation. Yes Historical Provider, MD   metoprolol (TOPROL-XL) 25 MG XL tablet Take 1 tablet by mouth daily. 2/7/15  Yes Erle Oppenheim, DO   calcium carbonate (OSCAL) 500 MG TABS tablet Take 500 mg by mouth 2 times daily. Yes Historical Provider, MD   acetaminophen (TYLENOL) 325 MG tablet Take 650 mg by mouth every 4 hours as needed for Pain or Fever    Yes Historical Provider, MD   vitamin B-12 (CYANOCOBALAMIN) 1000 MCG tablet Take 1,000 mcg by mouth daily. Yes Historical Provider, MD   traMADol (ULTRAM) 50 MG tablet Take 50 mg by mouth every 6 hours as needed for Pain . Yes Historical Provider, MD       Allergies:  Patient has no known allergies. Social History:   reports that she has never smoked. She does not have any smokeless tobacco history on file.  She reports

## 2018-04-07 LAB
EKG ATRIAL RATE: 94 BPM
EKG P AXIS: 84 DEGREES
EKG P-R INTERVAL: 146 MS
EKG Q-T INTERVAL: 356 MS
EKG QRS DURATION: 82 MS
EKG QTC CALCULATION (BAZETT): 445 MS
EKG R AXIS: 13 DEGREES
EKG T AXIS: 134 DEGREES
EKG VENTRICULAR RATE: 94 BPM

## 2020-01-01 ENCOUNTER — HOSPITAL ENCOUNTER (OUTPATIENT)
Age: 85
Discharge: HOME | End: 2020-06-07
Payer: MEDICARE

## 2020-01-01 ENCOUNTER — HOSPITAL ENCOUNTER (OUTPATIENT)
Age: 85
Discharge: HOME | End: 2020-07-10
Payer: MEDICARE

## 2020-01-01 ENCOUNTER — HOSPITAL ENCOUNTER (OUTPATIENT)
Age: 85
Discharge: HOME | End: 2020-08-01
Payer: MEDICARE

## 2020-01-01 ENCOUNTER — HOSPITAL ENCOUNTER (OUTPATIENT)
Age: 85
Discharge: HOME | End: 2020-06-20
Payer: MEDICARE

## 2020-01-01 ENCOUNTER — HOSPITAL ENCOUNTER (OUTPATIENT)
Age: 85
Discharge: HOME | End: 2020-08-07
Payer: MEDICARE

## 2020-01-01 ENCOUNTER — HOSPITAL ENCOUNTER (OUTPATIENT)
Age: 85
Discharge: HOME | End: 2020-05-23
Payer: MEDICARE

## 2020-01-01 LAB
SARS-COV-2: NOT DETECTED
SOURCE: NORMAL

## 2020-01-01 PROCEDURE — U0003 INFECTIOUS AGENT DETECTION BY NUCLEIC ACID (DNA OR RNA); SEVERE ACUTE RESPIRATORY SYNDROME CORONAVIRUS 2 (SARS-COV-2) (CORONAVIRUS DISEASE [COVID-19]), AMPLIFIED PROBE TECHNIQUE, MAKING USE OF HIGH THROUGHPUT TECHNOLOGIES AS DESCRIBED BY CMS-2020-01-R: HCPCS
